# Patient Record
Sex: FEMALE | Race: WHITE | NOT HISPANIC OR LATINO | Employment: OTHER | URBAN - METROPOLITAN AREA
[De-identification: names, ages, dates, MRNs, and addresses within clinical notes are randomized per-mention and may not be internally consistent; named-entity substitution may affect disease eponyms.]

---

## 2017-03-09 ENCOUNTER — OFFICE VISIT (OUTPATIENT)
Dept: FAMILY MEDICINE | Facility: CLINIC | Age: 32
End: 2017-03-09
Payer: COMMERCIAL

## 2017-03-09 VITALS — HEIGHT: 64 IN

## 2017-03-09 DIAGNOSIS — R05.9 COUGH: Primary | ICD-10-CM

## 2017-03-09 PROCEDURE — 99213 OFFICE O/P EST LOW 20 MIN: CPT | Performed by: NURSE PRACTITIONER

## 2017-03-09 RX ORDER — ALBUTEROL SULFATE 90 UG/1
2 AEROSOL, METERED RESPIRATORY (INHALATION) EVERY 4 HOURS PRN
Qty: 1 INHALER | Refills: 0 | Status: SHIPPED | OUTPATIENT
Start: 2017-03-09 | End: 2017-07-18

## 2017-03-09 RX ORDER — ALBUTEROL SULFATE 0.83 MG/ML
1 SOLUTION RESPIRATORY (INHALATION) ONCE
Qty: 1 VIAL | Refills: 0
Start: 2017-03-09 | End: 2017-03-09

## 2017-03-09 RX ORDER — ALBUTEROL SULFATE 90 UG/1
2 AEROSOL, METERED RESPIRATORY (INHALATION) EVERY 4 HOURS PRN
Qty: 1 INHALER | Refills: 0
Start: 2017-03-09 | End: 2017-03-09

## 2017-03-09 NOTE — MR AVS SNAPSHOT
"              After Visit Summary   3/9/2017    Aurora Durant    MRN: [id number]           Patient Information     Date Of Birth          1985        Visit Information        Provider Department      3/9/2017 5:00 PM Yehuda Gomez APRN CNP [facility]        Today's Diagnoses     Cough    -  1       Follow-ups after your visit        Who to contact     If you have questions or need follow up information about today's clinic visit or your schedule please contact [facility] directly at [phone].  Normal or non-critical lab and imaging results will be communicated to you by Qubolehart, letter or phone within 4 business days after the clinic has received the results. If you do not hear from us within 7 days, please contact the clinic through MyWeddingt or phone. If you have a critical or abnormal lab result, we will notify you by phone as soon as possible.  Submit refill requests through TeamDynamix or call your pharmacy and they will forward the refill request to us. Please allow 3 business days for your refill to be completed.          Additional Information About Your Visit        MyChart Information     TeamDynamix gives you secure access to your electronic health record. If you see a primary care provider, you can also send messages to your care team and make appointments. If you have questions, please call your primary care clinic.  If you do not have a primary care provider, please call [phone] and they will assist you.        Care EveryWhere ID     This is your Care EveryWhere ID. This could be used by other organizations to access your [place] medical records  [id number]        Your Vitals Were     Height Breastfeeding?                5' 4\" (1.626 m) No           Blood Pressure from Last 3 Encounters:   03/09/17 (P) 98/62   10/19/15 100/60   06/10/15 90/57    Weight from Last 3 Encounters:   03/09/17 (P) 121 lb (54.9 kg)   10/19/15 122 lb (55.3 kg)   06/10/15 119 lb 1.6 oz (54 " kg)              Today, you had the following     No orders found for display         Today's Medication Changes          These changes are accurate as of: 3/9/17 11:59 PM.  If you have any questions, ask your nurse or doctor.               Start taking these medicines.        Dose/Directions    * albuterol (2.5 MG/3ML) 0.083% neb solution   Used for:  Cough        Dose:  1 vial   Take 1 vial (2.5 mg) by nebulization once for 1 dose   Quantity:  1 vial   Refills:  0       * albuterol 108 (90 BASE) MCG/ACT Inhaler   Commonly known as:  albuterol   Used for:  Cough        Dose:  2 puff   Inhale 2 puffs into the lungs every 4 hours as needed for shortness of breath / dyspnea or wheezing   Quantity:  1 Inhaler   Refills:  0       * Notice:  This list has 2 medication(s) that are the same as other medications prescribed for you. Read the directions carefully, and ask your doctor or other care provider to review them with you.      Stop taking these medicines if you haven't already. Please contact your care team if you have questions.     atovaquone-proguanil 250-100 MG per tablet   Commonly known as:  MALARONE           typhoid CR capsule   Commonly known as:  VIVOTIF                Where to get your medicines      Some of these will need a paper prescription and others can be bought over the counter.  Ask your nurse if you have questions.     Bring a paper prescription for each of these medications     albuterol 108 (90 BASE) MCG/ACT Inhaler       You don't need a prescription for these medications     albuterol (2.5 MG/3ML) 0.083% neb solution                Primary Care Provider Office Phone # Fax #    Ariana Garcia PA-C [phone]       [facility] [address] [facility] [address]  [facility] [address]        Thank you!     Thank you for choosing [facility]  for your care. Our goal is always to provide you with excellent care. Hearing back from our patients is one way we can continue to  improve our services. Please take a few minutes to complete the written survey that you may receive in the mail after your visit with us. Thank you!             Your Updated Medication List - Protect others around you: Learn how to safely use, store and throw away your medicines at www.disposemymeds.org.          This list is accurate as of: 3/9/17 11:59 PM.  Always use your most recent med list.                   Brand Name Dispense Instructions for use    * albuterol (2.5 MG/3ML) 0.083% neb solution     1 vial    Take 1 vial (2.5 mg) by nebulization once for 1 dose       * albuterol 108 (90 BASE) MCG/ACT Inhaler    albuterol    1 Inhaler    Inhale 2 puffs into the lungs every 4 hours as needed for shortness of breath / dyspnea or wheezing       VITAMIN D PO          * Notice:  This list has 2 medication(s) that are the same as other medications prescribed for you. Read the directions carefully, and ask your doctor or other care provider to review them with you.

## 2017-03-09 NOTE — PROGRESS NOTES
HPI    SUBJECTIVE:                                                    Aurora Durant is a 31 year old female who presents to clinic today for the following health issues:      Chief Complaint   Patient presents with     Cough     x 1.5 weeks; dry cough w/ wheezing; tried mucinex and Dayquil/Nyquil w/o relief       Boyfriend has pneumonia   10 days of occasionally productive cough   Has noticed a couple wheezes  Initially was fatigued   No significant ENT symptoms       Past Medical History   Diagnosis Date     Anxiety      ASCUS with positive high risk HPV 12-3-10     GERD (gastroesophageal reflux disease)      History of depression      Iritis 2012     Past Surgical History   Procedure Laterality Date     No history of surgery       Colposcopy cervix, loop electrode biopsy, combined  3-25-11     [place]     Social History   Substance Use Topics     Smoking status: Never Smoker     Smokeless tobacco: Never Used     Alcohol use Yes      Comment: 1-2 drinks per week     Current Outpatient Prescriptions   Medication Sig Dispense Refill     Cholecalciferol (VITAMIN D PO)        Allergies   Allergen Reactions     No Known Drug Allergy        Reviewed PMH, med list and allergies.      ROS  Detailed as above       BP (P) 98/62 (BP Location: Right arm, Patient Position: Chair, Cuff Size: Adult Regular)  Pulse (P) 65  Temp (P) 98.5  F (36.9  C) (Oral)  Wt (P) 121 lb (54.9 kg)  SpO2 (P) 100%  Breastfeeding? No  BMI (P) 20.77 kg/m2      Physical Exam   Constitutional: She is well-developed, well-nourished, and in no distress.   HENT:   Head: Normocephalic.   Right Ear: Tympanic membrane, external ear and ear canal normal.   Left Ear: Tympanic membrane, external ear and ear canal normal.   Mouth/Throat: Oropharynx is clear and moist. No oropharyngeal exudate.   Eyes: Conjunctivae are normal.   Neck: Normal range of motion.   Cardiovascular: Normal rate, regular rhythm and normal heart sounds.    No murmur heard.  Pulmonary/Chest:  Effort normal and breath sounds normal. No respiratory distress.   Dry cough. One wheeze noted on lung auscultation    Lymphadenopathy:     She has no cervical adenopathy.   Neurological: She is alert.   Skin: Skin is warm and dry.   Psychiatric: Mood and affect normal.   Vitals reviewed.      Assessment and Plan:       ICD-10-CM    1. Cough R05 albuterol (2.5 MG/3ML) 0.083% neb solution     albuterol (ALBUTEROL) 108 (90 BASE) MCG/ACT Inhaler     DISCONTINUED: albuterol (ALBUTEROL) 108 (90 BASE) MCG/ACT Inhaler       Cough that is likely viral  Given neb here in clinic  Wrote for inhaler to fill in a couple days if she begins wheezing more   She should call with worsening symptoms. She may need prednisone if she begins having more wheezing as well       HERBERT Mauricio, CNP  [facility]

## 2017-03-09 NOTE — NURSING NOTE
"Chief Complaint   Patient presents with     Cough     x 1.5 weeks; dry cough w/ wheezing; tried mucinex and Dayquil/Nyquil w/o relief       Initial BP (P) 98/62 (BP Location: Right arm, Patient Position: Chair, Cuff Size: Adult Regular)  Pulse (P) 65  Temp (P) 98.5  F (36.9  C) (Oral)  Ht 5' 4\" (1.626 m)  Wt (P) 121 lb (54.9 kg)  SpO2 (P) 100%  Breastfeeding? No  BMI (P) 20.77 kg/m2 Estimated body mass index is 20.77 kg/(m^2) (pended) as calculated from the following:    Height as of this encounter: 5' 4\" (1.626 m).    Weight as of this encounter: (P) 121 lb (54.9 kg).  Medication Reconciliation: complete     Lukasz Page CMA     "

## 2017-03-13 ENCOUNTER — TELEPHONE (OUTPATIENT)
Dept: FAMILY MEDICINE | Facility: CLINIC | Age: 32
End: 2017-03-13

## 2017-03-13 NOTE — TELEPHONE ENCOUNTER
Per patient's my chart note dated 3-:    I saw you last week regarding a cough. I am not feeling well today and need to take a day off work; however, my boss is requested a doctor's note? Can I please receive a note from you? It doesn't need to be anything specific- I just need proof that I saw you regarding an illness.     Telephone encounter to Yehuda Gomez.  Martha Gregory MA

## 2017-07-05 ENCOUNTER — MYC MEDICAL ADVICE (OUTPATIENT)
Dept: FAMILY MEDICINE | Facility: CLINIC | Age: 32
End: 2017-07-05

## 2017-07-11 ENCOUNTER — DOCUMENTATION ONLY (OUTPATIENT)
Dept: LAB | Facility: CLINIC | Age: 32
End: 2017-07-11

## 2017-07-11 DIAGNOSIS — Z00.00 ENCOUNTER FOR ROUTINE ADULT HEALTH EXAMINATION WITHOUT ABNORMAL FINDINGS: Primary | ICD-10-CM

## 2017-07-17 DIAGNOSIS — Z53.9 ERRONEOUS ENCOUNTER--DISREGARD: Primary | ICD-10-CM

## 2017-07-18 ENCOUNTER — OFFICE VISIT (OUTPATIENT)
Dept: FAMILY MEDICINE | Facility: CLINIC | Age: 32
End: 2017-07-18
Payer: COMMERCIAL

## 2017-07-18 VITALS
HEART RATE: 71 BPM | WEIGHT: 112.3 LBS | BODY MASS INDEX: 19.17 KG/M2 | OXYGEN SATURATION: 100 % | HEIGHT: 64 IN | SYSTOLIC BLOOD PRESSURE: 81 MMHG | DIASTOLIC BLOOD PRESSURE: 54 MMHG | TEMPERATURE: 98.6 F

## 2017-07-18 DIAGNOSIS — Z53.9 ERRONEOUS ENCOUNTER--DISREGARD: Primary | ICD-10-CM

## 2017-07-18 DIAGNOSIS — R63.4 LOSS OF WEIGHT: ICD-10-CM

## 2017-07-18 DIAGNOSIS — Z86.2 HISTORY OF ANEMIA: ICD-10-CM

## 2017-07-18 DIAGNOSIS — R19.5 LOOSE STOOLS: ICD-10-CM

## 2017-07-18 DIAGNOSIS — R53.83 OTHER FATIGUE: ICD-10-CM

## 2017-07-18 DIAGNOSIS — Z00.00 ENCOUNTER FOR ROUTINE ADULT HEALTH EXAMINATION WITHOUT ABNORMAL FINDINGS: Primary | ICD-10-CM

## 2017-07-18 DIAGNOSIS — Z12.4 SCREENING FOR MALIGNANT NEOPLASM OF CERVIX: ICD-10-CM

## 2017-07-18 LAB
ERYTHROCYTE [DISTWIDTH] IN BLOOD BY AUTOMATED COUNT: 11.5 % (ref 10–15)
HCT VFR BLD AUTO: 41 % (ref 35–47)
HGB BLD-MCNC: 13.7 G/DL (ref 11.7–15.7)
MCH RBC QN AUTO: 31.9 PG (ref 26.5–33)
MCHC RBC AUTO-ENTMCNC: 33.4 G/DL (ref 31.5–36.5)
MCV RBC AUTO: 95 FL (ref 78–100)
PLATELET # BLD AUTO: 170 10E9/L (ref 150–450)
RBC # BLD AUTO: 4.3 10E12/L (ref 3.8–5.2)
VIT B12 SERPL-MCNC: 356 PG/ML (ref 193–986)
WBC # BLD AUTO: 4.5 10E9/L (ref 4–11)

## 2017-07-18 PROCEDURE — 82728 ASSAY OF FERRITIN: CPT | Performed by: PHYSICIAN ASSISTANT

## 2017-07-18 PROCEDURE — 84443 ASSAY THYROID STIM HORMONE: CPT | Performed by: PHYSICIAN ASSISTANT

## 2017-07-18 PROCEDURE — 36415 COLL VENOUS BLD VENIPUNCTURE: CPT | Performed by: PHYSICIAN ASSISTANT

## 2017-07-18 PROCEDURE — 99395 PREV VISIT EST AGE 18-39: CPT | Performed by: PHYSICIAN ASSISTANT

## 2017-07-18 PROCEDURE — 82607 VITAMIN B-12: CPT | Performed by: PHYSICIAN ASSISTANT

## 2017-07-18 PROCEDURE — G0145 SCR C/V CYTO,THINLAYER,RESCR: HCPCS | Performed by: PHYSICIAN ASSISTANT

## 2017-07-18 PROCEDURE — 82306 VITAMIN D 25 HYDROXY: CPT | Performed by: PHYSICIAN ASSISTANT

## 2017-07-18 PROCEDURE — 80053 COMPREHEN METABOLIC PANEL: CPT | Performed by: PHYSICIAN ASSISTANT

## 2017-07-18 PROCEDURE — 85027 COMPLETE CBC AUTOMATED: CPT | Performed by: PHYSICIAN ASSISTANT

## 2017-07-18 PROCEDURE — 87624 HPV HI-RISK TYP POOLED RSLT: CPT | Performed by: PHYSICIAN ASSISTANT

## 2017-07-18 PROCEDURE — 83516 IMMUNOASSAY NONANTIBODY: CPT | Performed by: PHYSICIAN ASSISTANT

## 2017-07-18 NOTE — MR AVS SNAPSHOT
After Visit Summary   7/18/2017    Aurora Durant    MRN: [id number]           Patient Information     Date Of Birth          1985        Visit Information        Provider Department      7/18/2017 10:30 AM Ariana Garcia PA-C [facility]        Today's Diagnoses     Encounter for routine adult health examination without abnormal findings    -  1    Screening for malignant neoplasm of cervix        Loss of weight        Other fatigue        History of anemia        Loose stools          Care Instructions      Preventive Health Recommendations  Female Ages 26 - 39  Yearly exam:   See your health care provider every year in order to    Review health changes.     Discuss preventive care.      Review your medicines if you your doctor has prescribed any.    Until age 30: Get a Pap test every three years (more often if you have had an abnormal result).    After age 30: Talk to your doctor about whether you should have a Pap test every 3 years or have a Pap test with HPV screening every 5 years.   You do not need a Pap test if your uterus was removed (hysterectomy) and you have not had cancer.  You should be tested each year for STDs (sexually transmitted diseases), if you're at risk.   Talk to your provider about how often to have your cholesterol checked.  If you are at risk for diabetes, you should have a diabetes test (fasting glucose).  Shots: Get a flu shot each year. Get a tetanus shot every 10 years.   Nutrition:     Eat at least 5 servings of fruits and vegetables each day.    Eat whole-grain bread, whole-wheat pasta and brown rice instead of white grains and rice.    Talk to your provider about Calcium and Vitamin D.     Lifestyle    Exercise at least 150 minutes a week (30 minutes a day, 5 days of the week). This will help you control your weight and prevent disease.    Limit alcohol to one drink per day.    No smoking.     Wear sunscreen to prevent skin cancer.    See your dentist  "every six months for an exam and cleaning.            Follow-ups after your visit        Who to contact     If you have questions or need follow up information about today's clinic visit or your schedule please contact [facility] directly at [phone].  Normal or non-critical lab and imaging results will be communicated to you by Sprout Routehart, letter or phone within 4 business days after the clinic has received the results. If you do not hear from us within 7 days, please contact the clinic through Sprout Routehart or phone. If you have a critical or abnormal lab result, we will notify you by phone as soon as possible.  Submit refill requests through [employer] or call your pharmacy and they will forward the refill request to us. Please allow 3 business days for your refill to be completed.          Additional Information About Your Visit        Sprout RouteharFoodBox Information     [employer] gives you secure access to your electronic health record. If you see a primary care provider, you can also send messages to your care team and make appointments. If you have questions, please call your primary care clinic.  If you do not have a primary care provider, please call [phone] and they will assist you.        Care EveryWhere ID     This is your Care EveryWhere ID. This could be used by other organizations to access your [place] medical records  [id number]        Your Vitals Were     Pulse Temperature Height Last Period Pulse Oximetry BMI (Body Mass Index)    71 98.6  F (37  C) (Oral) 5' 4\" (1.626 m) 06/26/2017 (Exact Date) 100% 19.28 kg/m2       Blood Pressure from Last 3 Encounters:   07/18/17 (!) 81/54   03/09/17 (P) 98/62   10/19/15 100/60    Weight from Last 3 Encounters:   07/18/17 112 lb 4.8 oz (50.9 kg)   03/09/17 (P) 121 lb (54.9 kg)   10/19/15 122 lb (55.3 kg)              We Performed the Following     CBC with platelets     Comprehensive metabolic panel     Ferritin     Pap imaged thin layer screen with HPV - " recommended age 30 - 65 years (select HPV order below)     Tissue transglutaminase yousif IgA and IgG     TSH with free T4 reflex     Vitamin B12     Vitamin D Deficiency        Primary Care Provider Office Phone # Fax #    Ariana Garcia PA-C [phone]       [facility] [address]  [facility] [address]        Equal Access to Services     CHELE GLORIA : Hadii aad ku hadasho Soomaali, waaxda luqadaha, qaybta kaalmada adeegyada, waxay idiin hayaan adeeg kharash la'aan ah. So [facility] [phone].    ATENCIÓN: Si habla español, tiene a last disposición servicios gratuitos de asistencia lingüística. Llame al [phone].    We comply with applicable federal civil rights laws and [place] laws. We do not discriminate on the basis of race, color, national origin, age, disability sex, sexual orientation or gender identity.            Thank you!     Thank you for choosing [facility]  for your care. Our goal is always to provide you with excellent care. Hearing back from our patients is one way we can continue to improve our services. Please take a few minutes to complete the written survey that you may receive in the mail after your visit with us. Thank you!             Your Updated Medication List - Protect others around you: Learn how to safely use, store and throw away your medicines at www.disposemymeds.org.          This list is accurate as of: 7/18/17  3:52 PM.  Always use your most recent med list.                   Brand Name Dispense Instructions for use Diagnosis    IRON SUPPLEMENT PO      Take 325 mg by mouth twice a week        LYSINE PO      Take by mouth twice a week        VITAMIN D PO      Take 2,000 Int'l Units by mouth twice a week

## 2017-07-18 NOTE — PROGRESS NOTES
SUBJECTIVE:   CC: Aurora Durant is an 31 year old woman who presents for preventive health visit.     Pt using condoms for birth control  Hx of HPV with colp in 2011  I don't have all of her records.  She is vegan since Jan but now feeling more sluggish since May  She has hx of depression and that was worse over the winter  She is taking vit D regularly and iron once or twice per week  She had loose stools for a few weeks.  She cut out dairy and her acid reflux is much improved  She is taking a protein powder most mornings.    Healthy Habits:  Annual Exam:  Getting at least 3 servings of Calcium per day:: Yes  Bi-annual eye exam:: Yes  Dental care twice a year:: Yes  Sleep apnea or symptoms of sleep apnea:: Daytime drowsiness  Diet:: Vegetarian/vegan  Frequency of exercise:: 2-3 days/week  Taking medications regularly:: Yes  Medication side effects:: Not applicable  Additional concerns today:: YES  PHQ-2 Score: 2  Duration of exercise:: 30-45 minutes          Today's PHQ-2 Score:   PHQ-2 ( 1999 [employer]) 7/18/2017 7/11/2017   Q1: Little interest or pleasure in doing things 0 1   Q2: Feeling down, depressed or hopeless 1 1   PHQ-2 Score 1 2   Q1: Little interest or pleasure in doing things - Several days   Q2: Feeling down, depressed or hopeless - Several days   PHQ-2 Score - 2       Abuse: Current or Past(Physical, Sexual or Emotional)- No  Do you feel safe in your environment - Yes    Social History   Substance Use Topics     Smoking status: Never Smoker     Smokeless tobacco: Never Used     Alcohol use Yes      Comment: 1-2 drinks per week     The patient does not drink >3 drinks per day nor >7 drinks per week.    Past Medical History:   Diagnosis Date     Anxiety      ASCUS with positive high risk HPV 12-3-10     GERD (gastroesophageal reflux disease)      History of depression      Iritis 2012     Past Surgical History:   Procedure Laterality Date     COLPOSCOPY CERVIX, LOOP ELECTRODE BIOPSY, COMBINED  3-25-11     "[place]     NO HISTORY OF SURGERY       Current Outpatient Prescriptions   Medication Sig Dispense Refill     LYSINE PO Take by mouth twice a week       Ferrous Sulfate (IRON SUPPLEMENT PO) Take 325 mg by mouth twice a week       Cholecalciferol (VITAMIN D PO) Take 2,000 Int'l Units by mouth twice a week          ROS:  C: NEGATIVE for fever, chills, change in weight  I: NEGATIVE for worrisome rashes, moles or lesions  E: NEGATIVE for vision changes or irritation  ENT: NEGATIVE for ear, mouth and throat problems  R: NEGATIVE for significant cough or SOB  B: NEGATIVE for masses, tenderness or discharge  CV: NEGATIVE for chest pain, palpitations or peripheral edema  GI: NEGATIVE for nausea, abdominal pain, heartburn, or change in bowel habits  : NEGATIVE for unusual urinary or vaginal symptoms. Periods are regular.  M: NEGATIVE for significant arthralgias or myalgia  N: NEGATIVE for weakness, dizziness or paresthesias  P: NEGATIVE for changes in mood or affect    OBJECTIVE:   BP (!) 81/54 (BP Location: Left arm, Patient Position: Chair, Cuff Size: Adult Regular)  Pulse 71  Temp 98.6  F (37  C) (Oral)  Ht 5' 4\" (1.626 m)  Wt 112 lb 4.8 oz (50.9 kg)  LMP 06/26/2017 (Exact Date)  SpO2 100%  BMI 19.28 kg/m2  EXAM:  GENERAL: healthy, alert and no distress  EYES: Eyes grossly normal to inspection, PERRL and conjunctivae and sclerae normal  HENT: ear canals and TM's normal, nose and mouth without ulcers or lesions  NECK: no adenopathy, no asymmetry, masses, or scars and thyroid normal to palpation  RESP: lungs clear to auscultation - no rales, rhonchi or wheezes  BREAST: normal without masses, tenderness or nipple discharge and no palpable axillary masses or adenopathy  CV: regular rate and rhythm, normal S1 S2, no S3 or S4, no murmur, click or rub, no peripheral edema and peripheral pulses strong  ABDOMEN: soft, nontender, no hepatosplenomegaly, no masses and bowel sounds normal  : no vulvar lesions. Speculum exam " "normal. Bimanual exam neg for adnexal masses. Pap done and sent.  MS: no gross musculoskeletal defects noted, no edema  SKIN: no suspicious lesions or rashes  NEURO: Normal strength and tone, mentation intact and speech normal  PSYCH: mentation appears normal, affect normal/bright    ASSESSMENT/PLAN:   Assessment and Plan:     (Z00.00) Encounter for routine adult health examination without abnormal findings  (primary encounter diagnosis)  Comment:   Plan: Comprehensive metabolic panel            (Z12.4) Screening for malignant neoplasm of cervix  Comment:   Plan: Pap imaged thin layer screen with HPV -         recommended age 30 - 65 years (select HPV order        below)            (R63.4) Loss of weight  Comment:   Plan: TSH with free T4 reflex            (R53.83) Other fatigue  Comment: if labs normal, consider tx with antidepressant.  Plan: Vitamin B12, CBC with platelets, Vitamin D         Deficiency, Ferritin            (Z86.2) History of anemia  Comment:   Plan: CBC today    (R19.5) Loose stools  Comment:   Plan: Tissue transglutaminase yousif IgA and IgG              COUNSELING:   Reviewed preventive health counseling, as reflected in patient instructions         reports that she has never smoked. She has never used smokeless tobacco.    Estimated body mass index is 19.28 kg/(m^2) as calculated from the following:    Height as of this encounter: 5' 4\" (1.626 m).    Weight as of this encounter: 112 lb 4.8 oz (50.9 kg).       Counseling Resources:  ATP IV Guidelines  Pooled Cohorts Equation Calculator  Breast Cancer Risk Calculator  FRAX Risk Assessment  ICSI Preventive Guidelines  Dietary Guidelines for Americans, 2010  USDA's MyPlate  ASA Prophylaxis  Lung CA Screening    Ariana Garcia PA-C  [facility]  Answers for HPI/ROS submitted by the patient on 7/11/2017     "

## 2017-07-18 NOTE — NURSING NOTE
"Chief Complaint   Patient presents with     Physical       Initial BP (!) 81/54 (BP Location: Left arm, Patient Position: Chair, Cuff Size: Adult Regular)  Pulse 71  Temp 98.6  F (37  C) (Oral)  Ht 5' 4\" (1.626 m)  Wt 112 lb 4.8 oz (50.9 kg)  LMP 06/26/2017 (Exact Date)  SpO2 100%  BMI 19.28 kg/m2 Estimated body mass index is 19.28 kg/(m^2) as calculated from the following:    Height as of this encounter: 5' 4\" (1.626 m).    Weight as of this encounter: 112 lb 4.8 oz (50.9 kg).  Medication Reconciliation: complete   Martha Gregory MA  "

## 2017-07-19 LAB
ALBUMIN SERPL-MCNC: 4.5 G/DL (ref 3.4–5)
ALP SERPL-CCNC: 52 U/L (ref 40–150)
ALT SERPL W P-5'-P-CCNC: 17 U/L (ref 0–50)
ANION GAP SERPL CALCULATED.3IONS-SCNC: 5 MMOL/L (ref 3–14)
AST SERPL W P-5'-P-CCNC: 19 U/L (ref 0–45)
BILIRUB SERPL-MCNC: 0.6 MG/DL (ref 0.2–1.3)
BUN SERPL-MCNC: 7 MG/DL (ref 7–30)
CALCIUM SERPL-MCNC: 9.3 MG/DL (ref 8.5–10.1)
CHLORIDE SERPL-SCNC: 106 MMOL/L (ref 94–109)
CO2 SERPL-SCNC: 27 MMOL/L (ref 20–32)
CREAT SERPL-MCNC: 0.93 MG/DL (ref 0.52–1.04)
DEPRECATED CALCIDIOL+CALCIFEROL SERPL-MC: 47 UG/L (ref 20–75)
FERRITIN SERPL-MCNC: 40 NG/ML (ref 12–150)
GFR SERPL CREATININE-BSD FRML MDRD: 71 ML/MIN/1.7M2
GLUCOSE SERPL-MCNC: 80 MG/DL (ref 70–99)
POTASSIUM SERPL-SCNC: 4.1 MMOL/L (ref 3.4–5.3)
PROT SERPL-MCNC: 8.5 G/DL (ref 6.8–8.8)
SODIUM SERPL-SCNC: 138 MMOL/L (ref 133–144)
TSH SERPL DL<=0.005 MIU/L-ACNC: 1.17 MU/L (ref 0.4–4)
TTG IGA SER-ACNC: 1 U/ML
TTG IGG SER-ACNC: NORMAL U/ML

## 2017-07-19 ASSESSMENT — PATIENT HEALTH QUESTIONNAIRE - PHQ9: SUM OF ALL RESPONSES TO PHQ QUESTIONS 1-9: 6

## 2017-07-19 NOTE — PROGRESS NOTES
It was a pleasure seeing you for your physical examination.  I wanted to get back to you with your test results.  I have enclosed a copy for your review.      I am happy to report that your CBC or complete blood count is normal with no signs of anemia, leukemia or platelet abnormalities. Your chemistry panel shows no signs of diabetes.  Your blood salts, kidney tests, liver tests, and proteins are all fine.    Your blood test for celiac disease was negative.  Your vitamin D level is normal.  Your thyroid test (TSH) was normal.  Your ferritin (iron stores) was normal.  Your vitamin B12 was slightly low at 356. This should be >400.   Start an over the counter sublingual vitamin B12 1000mcg/day.    Let me know if you have any questions.    Ariana Garcia PA-C

## 2017-07-20 LAB
COPATH REPORT: NORMAL
PAP: NORMAL

## 2017-07-21 LAB
FINAL DIAGNOSIS: NORMAL
HPV HR 12 DNA CVX QL NAA+PROBE: NEGATIVE
HPV16 DNA SPEC QL NAA+PROBE: NEGATIVE
HPV18 DNA SPEC QL NAA+PROBE: NEGATIVE
SPECIMEN DESCRIPTION: NORMAL

## 2018-05-27 ENCOUNTER — APPOINTMENT (OUTPATIENT)
Dept: GENERAL RADIOLOGY | Age: 33
End: 2018-05-27
Payer: COMMERCIAL

## 2018-05-27 ENCOUNTER — HOSPITAL ENCOUNTER (EMERGENCY)
Age: 33
Discharge: HOME | End: 2018-05-27
Payer: COMMERCIAL

## 2018-05-27 VITALS
BODY MASS INDEX: 28.35 KG/M2 | SYSTOLIC BLOOD PRESSURE: 113 MMHG | HEIGHT: 63 IN | WEIGHT: 160 LBS | OXYGEN SATURATION: 98 % | DIASTOLIC BLOOD PRESSURE: 79 MMHG | TEMPERATURE: 98.5 F | RESPIRATION RATE: 16 BRPM

## 2018-05-27 DIAGNOSIS — S66.801A INJURY OF FLEXOR TENDON OF RIGHT HAND, INITIAL ENCOUNTER: ICD-10-CM

## 2018-05-27 DIAGNOSIS — S61.216A LACERATION OF RIGHT LITTLE FINGER WITHOUT FOREIGN BODY WITHOUT DAMAGE TO NAIL, INITIAL ENCOUNTER: Primary | ICD-10-CM

## 2018-05-27 PROCEDURE — 12001 RPR S/N/AX/GEN/TRNK 2.5CM/<: CPT

## 2018-05-27 PROCEDURE — 73140 X-RAY EXAM OF FINGER(S): CPT

## 2018-05-27 PROCEDURE — 99282 EMERGENCY DEPT VISIT SF MDM: CPT

## 2018-05-27 RX ORDER — CEPHALEXIN 500 MG/1
500 CAPSULE ORAL 4 TIMES DAILY
Qty: 20 CAPSULE | Refills: 0 | Status: SHIPPED | OUTPATIENT
Start: 2018-05-27 | End: 2018-06-01

## 2018-05-27 ASSESSMENT — PAIN DESCRIPTION - ORIENTATION: ORIENTATION: RIGHT

## 2018-05-27 ASSESSMENT — PAIN DESCRIPTION - FREQUENCY: FREQUENCY: INTERMITTENT

## 2018-05-27 ASSESSMENT — PAIN DESCRIPTION - PAIN TYPE: TYPE: ACUTE PAIN

## 2018-05-27 ASSESSMENT — PAIN SCALES - GENERAL: PAINLEVEL_OUTOF10: 8

## 2018-05-27 ASSESSMENT — PAIN DESCRIPTION - LOCATION: LOCATION: FINGER (COMMENT WHICH ONE)

## 2018-05-27 ASSESSMENT — ENCOUNTER SYMPTOMS: RESPIRATORY NEGATIVE: 1

## 2018-07-23 ENCOUNTER — OFFICE VISIT (OUTPATIENT)
Dept: FAMILY MEDICINE | Facility: CLINIC | Age: 33
End: 2018-07-23
Payer: COMMERCIAL

## 2018-07-23 VITALS
OXYGEN SATURATION: 100 % | RESPIRATION RATE: 14 BRPM | SYSTOLIC BLOOD PRESSURE: 94 MMHG | DIASTOLIC BLOOD PRESSURE: 60 MMHG | WEIGHT: 108 LBS | HEIGHT: 64 IN | BODY MASS INDEX: 18.44 KG/M2 | HEART RATE: 64 BPM | TEMPERATURE: 98.2 F

## 2018-07-23 DIAGNOSIS — R63.6 UNDERWEIGHT: ICD-10-CM

## 2018-07-23 DIAGNOSIS — R10.9 STOMACH PAIN: ICD-10-CM

## 2018-07-23 DIAGNOSIS — R11.0 NAUSEA: ICD-10-CM

## 2018-07-23 DIAGNOSIS — R53.83 FATIGUE, UNSPECIFIED TYPE: ICD-10-CM

## 2018-07-23 DIAGNOSIS — Z78.9 VEGAN DIET: Primary | ICD-10-CM

## 2018-07-23 LAB
ERYTHROCYTE [DISTWIDTH] IN BLOOD BY AUTOMATED COUNT: 11.6 % (ref 10–15)
HCT VFR BLD AUTO: 38.6 % (ref 35–47)
HGB BLD-MCNC: 13.1 G/DL (ref 11.7–15.7)
MCH RBC QN AUTO: 32 PG (ref 26.5–33)
MCHC RBC AUTO-ENTMCNC: 33.9 G/DL (ref 31.5–36.5)
MCV RBC AUTO: 94 FL (ref 78–100)
PLATELET # BLD AUTO: 209 10E9/L (ref 150–450)
RBC # BLD AUTO: 4.09 10E12/L (ref 3.8–5.2)
WBC # BLD AUTO: 5.9 10E9/L (ref 4–11)

## 2018-07-23 PROCEDURE — 82746 ASSAY OF FOLIC ACID SERUM: CPT | Performed by: PHYSICIAN ASSISTANT

## 2018-07-23 PROCEDURE — 82306 VITAMIN D 25 HYDROXY: CPT | Performed by: PHYSICIAN ASSISTANT

## 2018-07-23 PROCEDURE — 84443 ASSAY THYROID STIM HORMONE: CPT | Performed by: PHYSICIAN ASSISTANT

## 2018-07-23 PROCEDURE — 80053 COMPREHEN METABOLIC PANEL: CPT | Performed by: PHYSICIAN ASSISTANT

## 2018-07-23 PROCEDURE — 82607 VITAMIN B-12: CPT | Performed by: PHYSICIAN ASSISTANT

## 2018-07-23 PROCEDURE — 85027 COMPLETE CBC AUTOMATED: CPT | Performed by: PHYSICIAN ASSISTANT

## 2018-07-23 PROCEDURE — 83540 ASSAY OF IRON: CPT | Performed by: PHYSICIAN ASSISTANT

## 2018-07-23 PROCEDURE — 99214 OFFICE O/P EST MOD 30 MIN: CPT | Performed by: PHYSICIAN ASSISTANT

## 2018-07-23 PROCEDURE — 83550 IRON BINDING TEST: CPT | Performed by: PHYSICIAN ASSISTANT

## 2018-07-23 PROCEDURE — 36415 COLL VENOUS BLD VENIPUNCTURE: CPT | Performed by: PHYSICIAN ASSISTANT

## 2018-07-23 NOTE — PROGRESS NOTES
SUBJECTIVE:   Aurora Durant is a 32 year old female who presents to clinic today for the following health issues:    Nausea      Duration: 2 weeks    Description (location/character/radiation): patient states that she has been feeling run down, fatigue, nausea and loss of appetite, sometimes feels like she has knots in her stomach. She states that she has been on a vegan diet.    Intensity:  mild, moderate    Accompanying signs and symptoms: see above; was taking vitamin B12 but this did not make her stomach feel good was taking for a few months about a year ago.  Gradually stopped taking.  Recently started a B12 and iron supplement but made stomach worse so she stopped again.      History (similar episodes/previous evaluation): None    Precipitating or alleviating factors: None    Therapies tried and outcome: None     Reviewed and updated as needed this visit by clinical staff  Tobacco  Allergies  Meds  Problems  Med Hx  Surg Hx  Fam Hx  Soc Hx        Reviewed and updated as needed this visit by Provider  Tobacco  Allergies  Meds  Problems  Med Hx  Surg Hx  Fam Hx  Soc Hx        Additional complaints: None    HPI additional notes: Aurora presents today with   Chief Complaint   Patient presents with     Nausea   Has also been having fluctuating symptoms of depression and anxiety.  Has been seeing a therapist since Feb which seems to be helping.  Has had weight loss since changing to a vegan diet with intermittent episodes of fatigue.  Has had 2 weeks of stomach upset and anorexia.  Her brother got  on Saturday during which time she was very busy.  Has not been having regular bowel movements as she has been eating less but denies any diarrhea or significant constipation.       ROS:  C: Negative for fever or chills. Positive for weight loss unintentional  Skin: Negative for worrisome rashes or lesions  ENT: Negative for ear, mouth and throat problems  Resp: Positive for shortness of breath on  "exertion  CV: Negative for chest pain or peripheral edema  GI:POSITIVE for abdominal pain periumbilical, constipation, gas or bloating, nausea, poor appetite and weight loss and NEGATIVE for diarrhea, heartburn or reflux, hematemesis, hematochezia, jaundice, melena and vomiting  MS: POSITIVE for generalized fatigue and malaise.  NEURO: Negative  for headaches or dizziness.  PSYCHIATRIC: POSITIVE for depressed mood or anxiety. Negative for thoughts of self harm or suicide  ROS all other systems negative.    Chart Review:  History   Smoking Status     Never Smoker   Smokeless Tobacco     Never Used     Recent Labs   Lab Test  07/18/17   1121  06/10/15   1424   ALT  17   --    CR  0.93   --    GFRESTIMATED  71   --    GFRESTBLACK  86   --    POTASSIUM  4.1   --    TSH  1.17  1.06      BP Readings from Last 3 Encounters:   07/23/18 94/60   07/18/17 (!) 81/54   03/09/17 (P) 98/62    Wt Readings from Last 3 Encounters:   07/23/18 108 lb (49 kg)   07/18/17 112 lb 4.8 oz (50.9 kg)   03/09/17 (P) 121 lb (54.9 kg)                  Patient Active Problem List   Diagnosis     Anxiety     Vegan diet     Underweight     Past Surgical History:   Procedure Laterality Date     COLPOSCOPY CERVIX, LOOP ELECTRODE BIOPSY, COMBINED  3-25-11    [place]     NO HISTORY OF SURGERY       Problem list, Medication list, Allergies, Medical/Social/Surg hx reviewed in [facility], updated as appropriate.   OBJECTIVE:                                                    BP 94/60  Pulse 64  Temp 98.2  F (36.8  C) (Tympanic)  Resp 14  Ht 5' 4\" (1.626 m)  Wt 108 lb (49 kg)  LMP 07/13/2018 (Approximate)  SpO2 100%  BMI 18.54 kg/m2  Body mass index is 18.54 kg/(m^2).  GENERAL: healthy, alert, in no acute distress  EYES: Grossly normal to inspection, EOMI, PERRL  HENT: Mucous mebranes moist.  NECK: Non-tender, no adenopathy.  RESP: lungs clear to auscultation - no rales, no rhonchi, no wheezes  CV: regular rate and rhythm, normal S1 S2. No peripheral " edema.  ABDOMEN: soft, nontender, no hepatosplenomegaly or masses. Normal bowel sounds in all four quadrants. Kim's negative, Rovsing's negative. No rebound.  MS: no gross deformities noted.  No CVA tenderness. No paralumbar tenderness.  SKIN: no suspicious lesions, no rashes  PSYCH: Alert and oriented times 3;  Able to articulate logical thoughts. Affect is normal.    Diagnostic test results:  Pending     ASSESSMENT/PLAN:                                                          ICD-10-CM    1. Vegan diet Z78.9 CBC with platelets     Vitamin B12     Iron and iron binding capacity     Folate     Vitamin D Deficiency   2. Fatigue, unspecified type R53.83 TSH with free T4 reflex     Vitamin D Deficiency     Comprehensive metabolic panel   3. Nausea R11.0    4. Stomach pain R10.9    5. Underweight R63.6      Will check labs today.  Pt has no pain on exam that would be consistent with cholecystitis or appendicitis.  She is currently seeing a therapist weekly which is helping with her depression and anxiety.  Discussed that these could be causing her current symptoms and we may want to try a low dose of medication to see if that would help improve her symptoms.  May also want to try starting a fiber supplement and probiotic daily to see if that helps with symptoms.  Will be in touch with patient once lab results return.    Please see patient instructions for treatment details.    Follow up as needed.    Kirstie Rene PA-C  [facility]

## 2018-07-23 NOTE — PATIENT INSTRUCTIONS
Fatigue  What is fatigue?   Fatigue is a condition of tiredness or weakness that is physical or mental, or both.   How does it occur?   Fatigue can happen for many reasons, but it is especially likely when you are having a lot of physical or mental stress. Fatigue may be caused by:   an illness   hormone problems, such as thyroid problems   overexertion   poor physical condition   lack of exercise   not enough sleep   overweight   poor diet   stress   emotional or psychological problems, especially depression   some medicines.   Fatigue can also be a symptom of a heart attack, especially in women. In this case, it usually is new and is severe fatigue that starts a day or two or just a few hours before a heart attack. Sometimes the fatigue starts a couple of weeks before a heart attack. Because new, unexplained fatigue can mean a heart attack is about to happen, it should be checked by your healthcare provider.   Overwhelming fatigue that lasts for at least 6 months and interferes with your daily life may be caused by a medical problem called chronic fatigue syndrome.   What are the symptoms?   Symptoms of fatigue are:   weakness   tiredness   indifference   lack of energy   lack of your usual endurance or stamina.   How is it diagnosed?   Your healthcare provider will review your symptoms and ask about your daily routine, work habits, environment, and emotional well-being. Your provider may examine you. You may have blood tests to check for diseases that can cause fatigue, such as diabetes, hypothyroidism, heart disease, lung disease, and anemia.   How is it treated?   The treatment depends on the cause. If fatigue is a symptom of another condition or illness, such as thyroid problems, that condition or disease will be the focus of treatment. If the cause is emotional or psychological, your healthcare provider may  you or refer you to a therapist for evaluation and counseling.   If new fatigue is  caused by worsening heart health, prompt recognition and treatment of heart disease may prevent a heart attack.   How long do the effects last?   The effects will last as long as the cause of the symptoms exists.   How can I take care of myself?   Get enough rest and sleep.   Eat a healthy diet. If you are overweight, begin a weight loss program after checking with your healthcare provider.   Walk or exercise according to your healthcare provider's recommendations. Exercise can increase your energy, improve your mood, and help you sleep better.   See a counselor if you are having emotional problems.   Learn to use deep breathing techniques, visualization, and meditation to relieve stress.   Allow yourself time to relax and do things you enjoy.   Meet new people and develop new interests.   How can I prevent fatigue?   If you are working longer hours or doing more physical work, allow yourself more time to sleep or rest.   If your work activity has become more strenuous, take breaks during the day to sit and rest.   Ask your provider about taking vitamin and mineral supplements.   Consider eating smaller meals 4 to 6 times a day if that seems to help you maintain a higher energy level. Eat more complex carbohydrates, such as whole grain rice and pasta, and eat less fat. Avoid foods that contain a lot of sugar. Avoid overeating.   Stop smoking.   Avoid caffeine, alcohol, and other drugs. In addition to their other negative effects, they can keep you from sleeping well.     Published by [employer].  This content is reviewed periodically and is subject to change as new health information becomes available. The information is intended to inform and educate and is not a replacement for medical evaluation, advice, diagnosis or treatment by a healthcare professional.   Developed by Sarah Hein RN, [place], and [employer].   ? 2010 [facility] and/or its affiliates. All Rights Reserved.   Copyright   Clinical Reference  Systems 2011  Adult Health Advisor

## 2018-07-23 NOTE — MR AVS SNAPSHOT
After Visit Summary   7/23/2018    Aurora Durant    MRN: [id number]           Patient Information     Date Of Birth          1985        Visit Information        Provider Department      7/23/2018 3:50 PM Kirstie Rene PA-C [facility]        Today's Diagnoses     Vegan diet    -  1    Fatigue, unspecified type        Nausea        Stomach pain        Underweight          Care Instructions                Fatigue  What is fatigue?   Fatigue is a condition of tiredness or weakness that is physical or mental, or both.   How does it occur?   Fatigue can happen for many reasons, but it is especially likely when you are having a lot of physical or mental stress. Fatigue may be caused by:   an illness   hormone problems, such as thyroid problems   overexertion   poor physical condition   lack of exercise   not enough sleep   overweight   poor diet   stress   emotional or psychological problems, especially depression   some medicines.   Fatigue can also be a symptom of a heart attack, especially in women. In this case, it usually is new and is severe fatigue that starts a day or two or just a few hours before a heart attack. Sometimes the fatigue starts a couple of weeks before a heart attack. Because new, unexplained fatigue can mean a heart attack is about to happen, it should be checked by your healthcare provider.   Overwhelming fatigue that lasts for at least 6 months and interferes with your daily life may be caused by a medical problem called chronic fatigue syndrome.   What are the symptoms?   Symptoms of fatigue are:   weakness   tiredness   indifference   lack of energy   lack of your usual endurance or stamina.   How is it diagnosed?   Your healthcare provider will review your symptoms and ask about your daily routine, work habits, environment, and emotional well-being. Your provider may examine you. You may have blood tests to check for diseases that  can cause fatigue, such as diabetes, hypothyroidism, heart disease, lung disease, and anemia.   How is it treated?   The treatment depends on the cause. If fatigue is a symptom of another condition or illness, such as thyroid problems, that condition or disease will be the focus of treatment. If the cause is emotional or psychological, your healthcare provider may  you or refer you to a therapist for evaluation and counseling.   If new fatigue is caused by worsening heart health, prompt recognition and treatment of heart disease may prevent a heart attack.   How long do the effects last?   The effects will last as long as the cause of the symptoms exists.   How can I take care of myself?   Get enough rest and sleep.   Eat a healthy diet. If you are overweight, begin a weight loss program after checking with your healthcare provider.   Walk or exercise according to your healthcare provider's recommendations. Exercise can increase your energy, improve your mood, and help you sleep better.   See a counselor if you are having emotional problems.   Learn to use deep breathing techniques, visualization, and meditation to relieve stress.   Allow yourself time to relax and do things you enjoy.   Meet new people and develop new interests.   How can I prevent fatigue?   If you are working longer hours or doing more physical work, allow yourself more time to sleep or rest.   If your work activity has become more strenuous, take breaks during the day to sit and rest.   Ask your provider about taking vitamin and mineral supplements.   Consider eating smaller meals 4 to 6 times a day if that seems to help you maintain a higher energy level. Eat more complex carbohydrates, such as whole grain rice and pasta, and eat less fat. Avoid foods that contain a lot of sugar. Avoid overeating.   Stop smoking.   Avoid caffeine, alcohol, and other drugs. In addition to their other negative effects, they can keep you from sleeping well.      Published by [employer].  This content is reviewed periodically and is subject to change as new health information becomes available. The information is intended to inform and educate and is not a replacement for medical evaluation, advice, diagnosis or treatment by a healthcare professional.   Developed by Sarah Hein RN, [place], and [employer].   ? 2010 [facility] and/or its affiliates. All Rights Reserved.   Copyright   Clinical Reference Systems 2011  Adult Health Advisor                Follow-ups after your visit        Who to contact     If you have questions or need follow up information about today's clinic visit or your schedule please contact [facility] directly at [phone].  Normal or non-critical lab and imaging results will be communicated to you by MyChart, letter or phone within 4 business days after the clinic has received the results. If you do not hear from us within 7 days, please contact the clinic through [employer] or phone. If you have a critical or abnormal lab result, we will notify you by phone as soon as possible.  Submit refill requests through [employer] or call your pharmacy and they will forward the refill request to us. Please allow 3 business days for your refill to be completed.          Additional Information About Your Visit        mSpot Information     [employer] gives you secure access to your electronic health record. If you see a primary care provider, you can also send messages to your care team and make appointments. If you have questions, please call your primary care clinic.  If you do not have a primary care provider, please call [phone] and they will assist you.        Care EveryWhere ID     This is your Care EveryWhere ID. This could be used by other organizations to access your [place] medical records  [id number]        Your Vitals Were     Pulse Temperature Respirations Height Last Period Pulse Oximetry    64 98.2  F (36.8  " C) (Tympanic) 14 5' 4\" (1.626 m) 07/13/2018 (Approximate) 100%    BMI (Body Mass Index)                   18.54 kg/m2            Blood Pressure from Last 3 Encounters:   07/23/18 94/60   07/18/17 (!) 81/54   03/09/17 (P) 98/62    Weight from Last 3 Encounters:   07/23/18 108 lb (49 kg)   07/18/17 112 lb 4.8 oz (50.9 kg)   03/09/17 (P) 121 lb (54.9 kg)              We Performed the Following     CBC with platelets     Comprehensive metabolic panel     Folate     Iron and iron binding capacity     TSH with free T4 reflex     Vitamin B12     Vitamin D Deficiency          Today's Medication Changes          These changes are accurate as of 7/23/18  4:16 PM.  If you have any questions, ask your nurse or doctor.               Stop taking these medicines if you haven't already. Please contact your care team if you have questions.     escitalopram 10 MG tablet   Commonly known as:  LEXAPRO   Stopped by:  Kirstie Rene PA-C                    Primary Care Provider Office Phone # Fax #    Ariana Maddie Garcia PA-C [phone] [address] [facility] [address]  [facility] [address]        Equal Access to Services     CHELE GLORIA AH: Hadii janeth ku hadasho Soomaali, waaxda luqadaha, qaybta kaalmada adeegyada, cindy perales. So [facility] [phone].    ATENCIÓN: Si habla español, tiene a last disposición servicios gratuitos de asistencia lingüística. Llras al [phone].    We comply with applicable federal civil rights laws and [place] laws. We do not discriminate on the basis of race, color, national origin, age, disability, sex, sexual orientation, or gender identity.            Thank you!     Thank you for choosing [facility]  for your care. Our goal is always to provide you with excellent care. Hearing back from our patients is one way we can continue to improve our services. Please take a few minutes to complete the written survey that you may receive " in the mail after your visit with us. Thank you!             Your Updated Medication List - Protect others around you: Learn how to safely use, store and throw away your medicines at www.disposemymeds.org.          This list is accurate as of 7/23/18  4:16 PM.  Always use your most recent med list.                   Brand Name Dispense Instructions for use Diagnosis    IRON SUPPLEMENT PO      Take 325 mg by mouth twice a week        LYSINE PO      Take by mouth twice a week        VITAMIN D PO      Take 2,000 Int'l Units by mouth twice a week

## 2018-07-24 LAB
ALBUMIN SERPL-MCNC: 4.2 G/DL (ref 3.4–5)
ALP SERPL-CCNC: 48 U/L (ref 40–150)
ALT SERPL W P-5'-P-CCNC: 21 U/L (ref 0–50)
ANION GAP SERPL CALCULATED.3IONS-SCNC: 5 MMOL/L (ref 3–14)
AST SERPL W P-5'-P-CCNC: 21 U/L (ref 0–45)
BILIRUB SERPL-MCNC: 0.4 MG/DL (ref 0.2–1.3)
BUN SERPL-MCNC: 7 MG/DL (ref 7–30)
CALCIUM SERPL-MCNC: 8.7 MG/DL (ref 8.5–10.1)
CHLORIDE SERPL-SCNC: 108 MMOL/L (ref 94–109)
CO2 SERPL-SCNC: 29 MMOL/L (ref 20–32)
CREAT SERPL-MCNC: 0.76 MG/DL (ref 0.52–1.04)
DEPRECATED CALCIDIOL+CALCIFEROL SERPL-MC: 45 UG/L (ref 20–75)
FOLATE SERPL-MCNC: 19.6 NG/ML
GFR SERPL CREATININE-BSD FRML MDRD: 89 ML/MIN/1.7M2
GLUCOSE SERPL-MCNC: 73 MG/DL (ref 70–99)
IRON SATN MFR SERPL: 25 % (ref 15–46)
IRON SERPL-MCNC: 74 UG/DL (ref 35–180)
POTASSIUM SERPL-SCNC: 3.7 MMOL/L (ref 3.4–5.3)
PROT SERPL-MCNC: 7.8 G/DL (ref 6.8–8.8)
SODIUM SERPL-SCNC: 142 MMOL/L (ref 133–144)
TIBC SERPL-MCNC: 292 UG/DL (ref 240–430)
TSH SERPL DL<=0.005 MIU/L-ACNC: 0.84 MU/L (ref 0.4–4)
VIT B12 SERPL-MCNC: 383 PG/ML (ref 193–986)

## 2018-07-25 NOTE — PROGRESS NOTES
Boo Simon,    I just wanted to let you know that your lab results have been reviewed and are attached.    - Your lab results look great, everything is normal.  You do not have any deficiencies due to your diet that could be causing your symptoms.  I'd like you to start metamucil once daily and a probiotic once daily to see if that helps with your symptoms.  I'm also going to send you a couple of questionnaires to better evaluated your mood to see how much that is contributing.    Please let me know if you have any questions and have a great week!    Sincerely,    Yuliet Rene PA-C    [facility]  [address]  [place] [address] [phone] (p)

## 2018-12-07 ENCOUNTER — TELEPHONE (OUTPATIENT)
Dept: INFECTIOUS DISEASES | Facility: CLINIC | Age: 33
End: 2018-12-07

## 2018-12-07 NOTE — TELEPHONE ENCOUNTER
Pt recently moved to [facility] from [place] and has a week of [place] left. She is seeking refill and ID appt . We will apply for [facility] coverage and ask our pharmacy to transfer hie Rx from [place].

## 2018-12-12 DIAGNOSIS — Z21 HUMAN IMMUNODEFICIENCY VIRUS I INFECTION (H): Primary | ICD-10-CM

## 2018-12-18 DIAGNOSIS — Z21 HIV (HUMAN IMMUNODEFICIENCY VIRUS INFECTION) (H): Primary | ICD-10-CM

## 2018-12-18 DIAGNOSIS — Z77.21 PERSONAL HISTORY OF EXPOSURE TO POTENTIALLY HAZARDOUS BODY FLUIDS: ICD-10-CM

## 2018-12-18 NOTE — PROGRESS NOTES
Per [facility] Ambulatory Care Protocol, Pt is due for routine labs based on disease state or monitoring of medications. Lab orders entered per clinic protocol.  Ama Baca RN

## 2019-03-20 ENCOUNTER — OFFICE VISIT (OUTPATIENT)
Dept: INFECTIOUS DISEASES | Facility: CLINIC | Age: 34
End: 2019-03-20
Attending: INTERNAL MEDICINE
Payer: COMMERCIAL

## 2019-03-20 VITALS
DIASTOLIC BLOOD PRESSURE: 72 MMHG | BODY MASS INDEX: 33.36 KG/M2 | HEIGHT: 63 IN | HEART RATE: 100 BPM | TEMPERATURE: 98.4 F | OXYGEN SATURATION: 100 % | WEIGHT: 188.3 LBS | SYSTOLIC BLOOD PRESSURE: 107 MMHG

## 2019-03-20 DIAGNOSIS — L29.9 ITCHING: ICD-10-CM

## 2019-03-20 DIAGNOSIS — Z21 ASYMPTOMATIC HUMAN IMMUNODEFICIENCY VIRUS (HIV) INFECTION STATUS (H): ICD-10-CM

## 2019-03-20 DIAGNOSIS — Z87.42 HISTORY OF ABNORMAL CERVICAL PAP SMEAR: ICD-10-CM

## 2019-03-20 DIAGNOSIS — Z21 HUMAN IMMUNODEFICIENCY VIRUS I INFECTION (H): Primary | ICD-10-CM

## 2019-03-20 PROCEDURE — G0463 HOSPITAL OUTPT CLINIC VISIT: HCPCS | Mod: ZF

## 2019-03-20 SDOH — HEALTH STABILITY: MENTAL HEALTH: HOW OFTEN DO YOU HAVE A DRINK CONTAINING ALCOHOL?: NEVER

## 2019-03-20 ASSESSMENT — MIFFLIN-ST. JEOR: SCORE: 1533.25

## 2019-03-20 ASSESSMENT — PAIN SCALES - GENERAL: PAINLEVEL: NO PAIN (0)

## 2019-03-20 NOTE — PROGRESS NOTES
[facility] - HIV Care  Patient:  Hetal Lainez, Date of birth 1985, Medical record number [id number]  Date of Visit:  03/20/2019         Assessment and Recommendations:   Hetal is a 32 y/o women who has been HIV-positive since 2013.  Has been virally suppressed on Odefsey but recently transferred care to [place] from [place].  Presents today to establish care.    # HIV infection: Last viral load was undetectable with CD4 1138 in 10/2018.  Continues on Odefsey but has missed more doses since the move to [place].  - Refilled Odefsey.  Discussed with Princess and we will enroll in HIV meds program and arrange to have pill boxes made to help with dose tracking and adherence.  - Patient has another appointment today so will return for routine HIV labs later this week.  With more recent missed doses due to her chaotic schedule and living arrangements, her viral suppression may be sup-optimal but awaiting results of labs today.    # Itching: I do not think this is likely related to her HIV meds.  Suggested trial of fragrence-free moisturizer such as Aquaphor or Eucerin.  # History of abnormal paps: First abnormal pap age 18 with colposcopy at that time.  Has continued to have abnormals since then.  Most recently s/p colposcopy in 2018.  Had been having paps q6months but was told ok to space out to yearly.  Has not been rescreened since then.  Referral placed for OB/gyn for follow up of this.  # Vaccines: Tdap 12/2014, PCV13 7/7/07, PPSV23 2/10/14.  HAV and HBV non-immune in 2013.    Staffed with Dr. Zavala.    I spent 60 minutes in direct care with this patient, including >50% of time face-to-face with the patient counseling about HIV care, medication adherence, and preventative care.    Lian Hoyos MD, PhD  Adult & Pediatric Infectious Diseases Fellow PGY7, [facility]  Pager: [phone]         History of Infectious Disease Illness:   Hetal presents today to transfer  her care to [place] after moving from [place] with her boyfriend.  Her boyfriends has family here but Hetal does not have any other connections.    Hetal was previously followed by Dr. Mcallister at [facility] and had a good relationship with him.  She was initially started on Complera when she was first diagnosed and then switched to Odefsey, which she continues on at present.  She has historically been well controlled and had undetectable when labs checked last fall.  Since moving, her life has been a lot more chaotic, leading to more missed doses  She missed about 6 doses in a row right after moving as her pill bottle was misplaced. More recently she has missed 5 or 6 doses in the past month due to irregular schedule and living in a hotel.  She spends a lot of time around her boyfriend's family and they do not know about her diagnosis.     Her main concern is a lot of itching of her skin after minor scratches and trauma.  Dr. Mcallister tried stopping her meds for 4 days to see if it was a med effect but it did not improve.  She does think her skin is quite dry and has not used moisturizer.        HIV History:   Date of Diagnosis: 9/2013, established care in [place] 10/9/13  Approximated time of transmission:  CD4 Kosta: 432 in 9/2013  Viral Load at Diagnosis: 14K in 2013  Risk Factors: Heterosexual  Opportunistic Infections: None  CMV Status: Pending  Toxo Status: Pending  HLA  Status: Negative (1/8/16 in [place])  STI Screening: Syphilis, gonorrhea, chlamydia negative 10/3/18 ([place]), HCV negative 10/9/13  Tuberculosis Screening: Quantiferon negative 10/9/13 ([place])  Historical use of ARVs: Complera 10/2014-, Odefsey  Historical Resistance Mutations: None on 10/1/13        Past Medical and Surgical History:     Past Medical History:   Diagnosis Date     Chlamydia 02/10/2014    RX azithromycin     Gonorrhea 05/04/2016     Group B Streptococcus carrier state affecting pregnancy 2015     HIV infection (H) 09/2013     Vaginal  "delivery 01/04/2015       Past Surgical History:   Procedure Laterality Date     COLPOSCOPY             Family History:   No family history on file.        Social History:     Social History     Tobacco Use     Smoking status: Current Every Day Smoker     Types: Cigarettes     Smokeless tobacco: Never Used   Substance Use Topics     Alcohol use: No     Frequency: Never     Drug use: No     Social History     Social History Narrative     Not on file            Review of Systems:   CONSTITUTIONAL:  No fevers or chills  EYES: negative for icterus  ENT:  negative for hearing loss, tinnitus, sore throat  RESPIRATORY:  negative for cough, sputum or dyspnea  CARDIOVASCULAR:  negative for chest pain, palpitations  GASTROINTESTINAL:  negative for nausea, vomiting, diarrhea or constipation  GENITOURINARY:  negative for dysuria  HEME:  No easy bruising  INTEGUMENT:  negative for rash or pruritus  NEURO:  Negative for headache         Current Medications:     Current Outpatient Medications   Medication Sig Dispense Refill     emtricitabine-rilpivirine-tenofovir (ODEFSEY) 200-25-25 MG TABS per tablet Take 1 tablet by mouth daily 30 tablet 3            Immunization History:     Immunization History   Administered Date(s) Administered     Influenza (IIV3) PF 10/31/2013, 10/08/2014, 01/08/2016     Influenza Vaccine IM 3yrs+ 4 Valent IIV4 10/03/2018     Influenza Vaccine, 3 YRS +, IM (QUADRIVALENT W/PRESERVATIVES) 10/03/2016     Pneumo Conj 13-V (2010&after) 07/07/2017     Pneumococcal 23 valent 02/10/2014     TDAP Vaccine (Adacel) 12/10/2014            Allergies:   No Known Allergies         Physical Exam:   Vital signs:  /72   Pulse 100   Temp 98.4  F (36.9  C) (Oral)   Ht 1.6 m (5' 3\")   Wt 85.4 kg (188 lb 4.8 oz)   SpO2 100%   BMI 33.36 kg/m      Physical Examination:  GENERAL:  well-developed, well-nourished, seated in no acute distress.  HEENT:  Head is normocephalic, atraumatic   EYES:  Eyes have anicteric sclerae " without conjunctival injection   LUNGS:  Breathing comfortably on room air  CARDIOVASCULAR:  Warm and well perfused  SKIN:  No acute rashes.    NEUROLOGIC:  Grossly nonfocal. Active x4 extremities         Laboratory Data:     CD4   10/2018 1138    VL   10/2018 undetectable

## 2019-03-21 ENCOUNTER — ALLIED HEALTH/NURSE VISIT (OUTPATIENT)
Dept: PHARMACY | Facility: CLINIC | Age: 34
End: 2019-03-21
Payer: COMMERCIAL

## 2019-03-21 DIAGNOSIS — B20 HUMAN IMMUNODEFICIENCY VIRUS (HIV) DISEASE (H): Primary | ICD-10-CM

## 2019-03-21 PROCEDURE — 99207 ZZC NO CHARGE LOS: CPT | Performed by: PHARMACIST

## 2019-03-21 NOTE — PROGRESS NOTES
Therapy Management:                                                    Hetal Lainez is a 33 year old female called for a therapy management visit.  She was referred to me from Dr. Hoyos.     Reason for Consult: Medication adherence/med boxes provided by our pharmacy.    Discussion: Hetal reports she has been taking Odefsey for a while now, but has problems remembering doses. Reports she believes med boxes would help. Reports she tolerates medication well, and generally takes it in the afternoon after food.    Plan:  1. Discussed the importance of strict medication adherence to achieve and maintain an undetectable viral load.   2. Our pharmacy will fill med boxes for her and pt agreed to pick them up tomorrow (reports she has a few doses left)  3. Pt is asked to call with any questions/concerns.     Princess Barnard, [facility] Pharmacist.   [phone]

## 2019-03-23 PROBLEM — Z21 HUMAN IMMUNODEFICIENCY VIRUS I INFECTION (H): Status: ACTIVE | Noted: 2019-03-23

## 2019-03-23 PROBLEM — Z87.42 HISTORY OF ABNORMAL CERVICAL PAP SMEAR: Status: ACTIVE | Noted: 2019-03-23

## 2019-03-23 PROBLEM — Z21 HUMAN IMMUNODEFICIENCY VIRUS I INFECTION (H): Status: ACTIVE | Noted: 2019-03-20

## 2019-03-23 PROBLEM — Z87.42 HISTORY OF ABNORMAL CERVICAL PAP SMEAR: Status: ACTIVE | Noted: 2019-03-20

## 2019-03-23 NOTE — PROGRESS NOTES
[facility] Staff Note: Ms. Lainez was seen, examined, and the case was discussed with Dr. Hoyos, ID Fellow -- I agree with her new patient history and examination, assessment and plan in this outpatient ID Consult note. This note reflects my observations and opinions, and the plan outlined fully reflects my approach. I have reviewed the available history, radiology, laboratory results, and reports with the Fellow.

## 2019-04-22 ENCOUNTER — TELEPHONE (OUTPATIENT)
Dept: INFECTIOUS DISEASES | Facility: CLINIC | Age: 34
End: 2019-04-22

## 2019-04-22 NOTE — TELEPHONE ENCOUNTER
Spoke with pt. She reports that just went to urgent care. The doctor there thought her back pain was from a muscle, but did get a urine sample from her. Pt will call us back if she needs anything from Dr. Hoyos or our clinic.  Ama Baca RN

## 2019-04-22 NOTE — TELEPHONE ENCOUNTER
[facility] Call Center    Phone Message    May a detailed message be left on voicemail: yes    Reason for Call: Symptoms or Concerns     If patient has red-flag symptoms, warm transfer to triage line    Current symptom or concern: Kidneys hurting for 4 days now.     Symptoms have been present for:  4 day(s)    Has patient previously been seen for this? No    By Dr. Israel Hoyos    Date: 04/22/2019    Are there any new or worsening symptoms? Yes: It's worse this morning. Pt is unsure what she should do.       Action Taken: Message routed to:  [facility] ID

## 2019-04-24 ENCOUNTER — TELEPHONE (OUTPATIENT)
Dept: PHARMACY | Facility: CLINIC | Age: 34
End: 2019-04-24

## 2019-04-25 DIAGNOSIS — Z21 ASYMPTOMATIC HUMAN IMMUNODEFICIENCY VIRUS (HIV) INFECTION STATUS (H): ICD-10-CM

## 2019-04-25 DIAGNOSIS — Z77.21 PERSONAL HISTORY OF EXPOSURE TO POTENTIALLY HAZARDOUS BODY FLUIDS: ICD-10-CM

## 2019-04-25 LAB
ALBUMIN SERPL-MCNC: 4.2 G/DL (ref 3.4–5)
ALBUMIN UR-MCNC: NEGATIVE MG/DL
ALP SERPL-CCNC: 73 U/L (ref 40–150)
ALT SERPL W P-5'-P-CCNC: 28 U/L (ref 0–50)
ANION GAP SERPL CALCULATED.3IONS-SCNC: 4 MMOL/L (ref 3–14)
APPEARANCE UR: CLEAR
AST SERPL W P-5'-P-CCNC: 16 U/L (ref 0–45)
BASOPHILS # BLD AUTO: 0.1 10E9/L (ref 0–0.2)
BASOPHILS NFR BLD AUTO: 0.6 %
BILIRUB SERPL-MCNC: 0.4 MG/DL (ref 0.2–1.3)
BILIRUB UR QL STRIP: NEGATIVE
BUN SERPL-MCNC: 9 MG/DL (ref 7–30)
CALCIUM SERPL-MCNC: 9.1 MG/DL (ref 8.5–10.1)
CD3 CELLS # BLD: 1888 CELLS/UL (ref 603–2990)
CD3 CELLS NFR BLD: 75 % (ref 49–84)
CD3+CD4+ CELLS # BLD: 1259 CELLS/UL (ref 441–2156)
CD3+CD4+ CELLS NFR BLD: 50 % (ref 28–63)
CD3+CD4+ CELLS/CD3+CD8+ CLL BLD: 2 % (ref 1.4–2.6)
CD3+CD8+ CELLS # BLD: 624 CELLS/UL (ref 125–1312)
CD3+CD8+ CELLS NFR BLD: 25 % (ref 10–40)
CHLORIDE SERPL-SCNC: 105 MMOL/L (ref 94–109)
CO2 SERPL-SCNC: 28 MMOL/L (ref 20–32)
COLOR UR AUTO: YELLOW
CREAT SERPL-MCNC: 0.73 MG/DL (ref 0.52–1.04)
DIFFERENTIAL METHOD BLD: NORMAL
EOSINOPHIL # BLD AUTO: 0.2 10E9/L (ref 0–0.7)
EOSINOPHIL NFR BLD AUTO: 1.9 %
ERYTHROCYTE [DISTWIDTH] IN BLOOD BY AUTOMATED COUNT: 12.3 % (ref 10–15)
GFR SERPL CREATININE-BSD FRML MDRD: >90 ML/MIN/{1.73_M2}
GLUCOSE SERPL-MCNC: 98 MG/DL (ref 70–99)
GLUCOSE UR STRIP-MCNC: NEGATIVE MG/DL
HAV IGG SER QL IA: NONREACTIVE
HBV CORE AB SERPL QL IA: NONREACTIVE
HBV SURFACE AB SERPL IA-ACNC: 1.22 M[IU]/ML
HBV SURFACE AG SERPL QL IA: NONREACTIVE
HCT VFR BLD AUTO: 41.1 % (ref 35–47)
HCV AB SERPL QL IA: NONREACTIVE
HGB BLD-MCNC: 13.9 G/DL (ref 11.7–15.7)
HGB UR QL STRIP: NEGATIVE
IFC SPECIMEN: NORMAL
IMM GRANULOCYTES # BLD: 0 10E9/L (ref 0–0.4)
IMM GRANULOCYTES NFR BLD: 0.3 %
KETONES UR STRIP-MCNC: NEGATIVE MG/DL
LEUKOCYTE ESTERASE UR QL STRIP: NEGATIVE
LYMPHOCYTES # BLD AUTO: 2.5 10E9/L (ref 0.8–5.3)
LYMPHOCYTES NFR BLD AUTO: 28.2 %
MCH RBC QN AUTO: 32.6 PG (ref 26.5–33)
MCHC RBC AUTO-ENTMCNC: 33.8 G/DL (ref 31.5–36.5)
MCV RBC AUTO: 96 FL (ref 78–100)
MONOCYTES # BLD AUTO: 0.3 10E9/L (ref 0–1.3)
MONOCYTES NFR BLD AUTO: 3.8 %
NEUTROPHILS # BLD AUTO: 5.8 10E9/L (ref 1.6–8.3)
NEUTROPHILS NFR BLD AUTO: 65.2 %
NITRATE UR QL: NEGATIVE
NRBC # BLD AUTO: 0 10*3/UL
NRBC BLD AUTO-RTO: 0 /100
PH UR STRIP: 7 PH (ref 5–7)
PLATELET # BLD AUTO: 270 10E9/L (ref 150–450)
POTASSIUM SERPL-SCNC: 4.2 MMOL/L (ref 3.4–5.3)
PROT SERPL-MCNC: 7.8 G/DL (ref 6.8–8.8)
RBC # BLD AUTO: 4.27 10E12/L (ref 3.8–5.2)
RBC #/AREA URNS AUTO: <1 /HPF (ref 0–2)
SODIUM SERPL-SCNC: 136 MMOL/L (ref 133–144)
SOURCE: NORMAL
SP GR UR STRIP: 1.01 (ref 1–1.03)
SQUAMOUS #/AREA URNS AUTO: 1 /HPF (ref 0–1)
UROBILINOGEN UR STRIP-MCNC: 0 MG/DL (ref 0–2)
WBC # BLD AUTO: 8.9 10E9/L (ref 4–11)
WBC #/AREA URNS AUTO: <1 /HPF (ref 0–5)

## 2019-04-26 LAB
C TRACH DNA SPEC QL NAA+PROBE: NEGATIVE
CMV IGG SERPL QL IA: >8 AI (ref 0–0.8)
N GONORRHOEA DNA SPEC QL NAA+PROBE: NEGATIVE
SPECIMEN SOURCE: NORMAL
SPECIMEN SOURCE: NORMAL
T GONDII IGG SER QL: <3 IU/ML (ref 0–7.1)

## 2019-04-27 LAB
HIV1 RNA # PLAS NAA DL=20: NORMAL {COPIES}/ML
HIV1 RNA SERPL NAA+PROBE-LOG#: NORMAL {LOG_COPIES}/ML

## 2019-04-29 ENCOUNTER — TELEPHONE (OUTPATIENT)
Dept: INFECTIOUS DISEASES | Facility: CLINIC | Age: 34
End: 2019-04-29

## 2019-04-29 NOTE — TELEPHONE ENCOUNTER
[facility] Call Center    Phone Message    May a detailed message be left on voicemail: yes    Reason for Call: Other: per pt- stated she is waiting on test results, please call pt back asap thanks     Action Taken: Message routed to:  [facility]): I.D

## 2019-05-01 NOTE — TELEPHONE ENCOUNTER
Lian Hoyos MD  You 1 hour ago (1:42 PM)      Would you let her know her labs look fine. Normal CD4 and undetectable viral load.  She is coming to see me next week.        Relayed lab results to pt via phone. She also reports that she has recurring bacterial vaginosis and has severe symptoms. She is requesting metronidazole gel to be refilled that she has gotten in the past from an MD near her home that she recently moved from. Per Dr. Hoyos, pt should be seen in urgent care and be evaluated for this since she has never evaluated her for this before or prescribed metronidazole. Pt also has appt scheduled with Dr. Hoyos on 5/8. Tried calling pt to relay this information, but her voicemail box has not been set up yet.  Ama Baca RN

## 2019-05-01 NOTE — TELEPHONE ENCOUNTER
[facility] Call Center    Phone Message    May a detailed message be left on voicemail: no    Reason for Call: Other: Patient called on 5/1/19 at 5:43 p.m. and was informed of her appointment at NP clinic, 5/6 at 9:30am.     Action Taken: Message routed to:  [facility]): Infectious Disease

## 2019-05-01 NOTE — TELEPHONE ENCOUNTER
Scheduled pt an appt with the NP clinic, 5/6 at 9:30am, so that she can be evaluated for BV and establish primary care. Tried calling her to let her know, but her VM box has not been set up yet.  Ama Baca RN      *Call center staff- if pt calls back, please notify her of this appt. NP and primary care clinics did not have any openings for Thur or Fri this week.    Ama Baca RN

## 2019-05-06 ENCOUNTER — OFFICE VISIT (OUTPATIENT)
Dept: FAMILY MEDICINE | Facility: CLINIC | Age: 34
End: 2019-05-06
Payer: COMMERCIAL

## 2019-05-06 VITALS
OXYGEN SATURATION: 100 % | BODY MASS INDEX: 32.97 KG/M2 | WEIGHT: 186.1 LBS | DIASTOLIC BLOOD PRESSURE: 78 MMHG | SYSTOLIC BLOOD PRESSURE: 115 MMHG | HEART RATE: 81 BPM | HEIGHT: 63 IN

## 2019-05-06 DIAGNOSIS — Z12.4 SCREENING FOR CERVICAL CANCER: ICD-10-CM

## 2019-05-06 DIAGNOSIS — B96.89 BACTERIAL VAGINOSIS: ICD-10-CM

## 2019-05-06 DIAGNOSIS — B37.31 CANDIDAL VULVOVAGINITIS: ICD-10-CM

## 2019-05-06 DIAGNOSIS — N76.0 BACTERIAL VAGINOSIS: ICD-10-CM

## 2019-05-06 DIAGNOSIS — Z00.00 ENCOUNTER FOR MEDICAL EXAMINATION TO ESTABLISH CARE: Primary | ICD-10-CM

## 2019-05-06 PROCEDURE — G0476 HPV COMBO ASSAY CA SCREEN: HCPCS | Performed by: NURSE PRACTITIONER

## 2019-05-06 PROCEDURE — G0145 SCR C/V CYTO,THINLAYER,RESCR: HCPCS | Performed by: NURSE PRACTITIONER

## 2019-05-06 PROCEDURE — 87624 HPV HI-RISK TYP POOLED RSLT: CPT | Performed by: NURSE PRACTITIONER

## 2019-05-06 RX ORDER — METRONIDAZOLE 7.5 MG/G
1 GEL VAGINAL DAILY
Qty: 70 G | Refills: 0 | Status: SHIPPED | OUTPATIENT
Start: 2019-05-06

## 2019-05-06 RX ORDER — FLUCONAZOLE 100 MG/1
100 TABLET ORAL DAILY
Qty: 1 TABLET | Refills: 0 | Status: SHIPPED | OUTPATIENT
Start: 2019-05-06

## 2019-05-06 ASSESSMENT — ENCOUNTER SYMPTOMS
NAUSEA: 0
DECREASED APPETITE: 0
BLOOD IN STOOL: 0
DYSURIA: 0
CHILLS: 0
CONSTIPATION: 0
FATIGUE: 0
VOMITING: 0
ABDOMINAL PAIN: 0
HEMATURIA: 0
DIFFICULTY URINATING: 0
DIARRHEA: 0
FEVER: 0

## 2019-05-06 ASSESSMENT — MIFFLIN-ST. JEOR: SCORE: 1523.27

## 2019-05-06 NOTE — PATIENT INSTRUCTIONS
[facility] Medication Refill Request Information:  * Please contact your pharmacy regarding ANY request for medication refills.  ** [facility] Clinic Prescription Fax = [phone]  * Please allow 3 business days for routine medication refills.  * Please allow 5 business days for controlled substance medication refills.     [facility] Test Result notification information:  *You will be notified with in 7-10 days of your appointment day regarding the results of your test.  If you are on MyChart you will be notified as soon as the provider has reviewed the results and signed off on them.    [facility]: [phone]     Other location where it is more kid friendly:     [address] [address]  [phone]    Open 8-5pm Monday - Friday     Come back for your HPV vaccine.     2nd dose in 1 month (after 6/6/19)  3rd dose in 6 months (after 12/6/19)

## 2019-05-06 NOTE — PROGRESS NOTES
"       HPI       Hetal Lainez is a 33 year old woman who presents for the new concern(s) of:.  Chief Complaint   Patient presents with     Vaginal Problem     Pt is having white discharge, fishy odor and itchiness.      Establish Care     Pt is here to establish care.     Hetal has a previous medical history of HIV, abnormal pap of cervix with three previous colposcopies, and recurrent bacterial vaginosis.     Presents to clinic today to establish care and to have believed repeat bacterial vaginosis infection evaluated. Reports symptom onset about a week ago of white vaginal discharge, mild irritation/itching, and fishy, \"musty\" odor. Denies any abdominal or pelvic pain. No dysuria, constipation, nausea, or vomiting. Is not currently on any birth control, uses condoms with her significant other and other male partners. Denies any need for STI testing today, had done with ID Dr. Hoyos on 4/25/19 and has not had any new partners since. Previously tolerated treatment of Metro-Gel vaginally without issue; oral metronidazole has made her nauseated in past. Endorses often developing a concurrent yeast infection while on antibiotics for bacterial vaginosis and requesting prophylactic dose today.     Hetal is due for a pap - last was anywhere from 12-18 months ago while living in [place], [place]. Uncertain of results however \"they tried calling me a bunch and leaving me a lot of messages about my results. I never got back to them\". Will complete pap today.     Problem, Medication and Allergy Lists were       Current Outpatient Medications   Medication Sig Dispense Refill     emtricitabine-rilpivirine-tenofovir (ODEFSEY) 200-25-25 MG TABS per tablet Take 1 tablet by mouth daily 30 tablet 3     fluconazole (DIFLUCAN) 100 MG tablet Take 1 tablet (100 mg) by mouth daily 1 tablet 0     metroNIDAZOLE (METROGEL) 0.75 % vaginal gel Place 1 applicator (5 g) vaginally daily 70 g 0       No Known Allergies.    Patient is a new patient " "to this clinic and so  I reviewed/updated the Past Medical History, the Family History and the Social History .           Review of Systems:   Review of Systems     Constitutional:  Negative for fever, chills, fatigue and decreased appetite.   Cardiovascular:  Negative for chest pain.   Gastrointestinal:  Negative for nausea, vomiting, abdominal pain, diarrhea, constipation, blood in stool and change in stool.   Genitourinary:  Positive for vaginal discharge. Negative for dysuria, hematuria, difficulty urinating, genital sores, dyspareunia, voiding less frequently, excessive menstruation, menstrual changes and vaginal dryness.               Physical Exam:     Vitals:    05/06/19 1035   BP: 115/78   Pulse: 81   SpO2: 100%   Weight: 84.4 kg (186 lb 1.6 oz)   Height: 1.6 m (5' 3\")     Body mass index is 32.97 kg/m .  Vitals were reviewed and were normal     Physical Exam   Constitutional: She is oriented to person, place, and time. She appears well-developed and well-nourished. No distress.   HENT:   Head: Normocephalic and atraumatic.   Right Ear: External ear normal.   Left Ear: External ear normal.   Nose: Nose normal.   Eyes: Pupils are equal, round, and reactive to light. Conjunctivae and EOM are normal. Right eye exhibits no discharge. Left eye exhibits no discharge. No scleral icterus.   Neck: Normal range of motion.   Pulmonary/Chest: Effort normal. No respiratory distress.   Abdominal: Soft. Bowel sounds are normal. She exhibits no distension and no mass. There is no tenderness. There is no rebound and no guarding. No hernia. Hernia confirmed negative in the right inguinal area and confirmed negative in the left inguinal area.   Genitourinary: Uterus normal. No labial fusion. There is no rash, tenderness, lesion or injury on the right labia. There is no rash, tenderness, lesion or injury on the left labia. Cervix exhibits discharge. Cervix exhibits no motion tenderness and no friability. Right adnexum displays " no mass, no tenderness and no fullness. Left adnexum displays no mass, no tenderness and no fullness. No erythema, tenderness or bleeding in the vagina. No foreign body in the vagina. No signs of injury around the vagina. Vaginal discharge found.   Lymphadenopathy: No inguinal adenopathy noted on the right or left side.   Neurological: She is alert and oriented to person, place, and time. No cranial nerve deficit.   Skin: Skin is warm and dry. Capillary refill takes less than 2 seconds. No rash noted. She is not diaphoretic. No erythema. No pallor.   Psychiatric: She has a normal mood and affect. Her behavior is normal.   Vitals reviewed.        Results:     Pending pap with HPV screening    Assessment and Plan     1. Encounter for medical examination to establish care  With new FDA approval for HPV vaccine in adults up to age 45 offered to patient as she has history of several abnormal paps. Unable to stay today to have completed, stated she will return as a nurse-only visit later this week to start series.   - HPV VACCINE (GARDASIL), QUADRAVALENT; Future    2. Bacterial vaginosis  Recurrent BV. Encouraged patient to start OTC women's probiotic to help re-establish and maintain healthy vaginal mariana for ongoing infections. Reviewed side effects of medication, risks and benefits, and proper medication administration. Discussed red flag symptoms and when patient should seek immediate medical attention. Hetal Lainez verbalized understanding.   - metroNIDAZOLE (METROGEL) 0.75 % vaginal gel; Place 1 applicator (5 g) vaginally daily  Dispense: 70 g; Refill: 0    3. Candidal vulvovaginitis  - fluconazole (DIFLUCAN) 100 MG tablet; Take 1 tablet (100 mg) by mouth daily  Dispense: 1 tablet; Refill: 0    4. Screening for cervical cancer  Pap completed today as patient reports history of several abnormal results and colposcopies.   - Pap imaged thin layer screen with HPV - recommended age 30 - 65 years (select HPV order  below)  - HPV High Risk Types DNA Cervical    There are no discontinued medications.    Options for treatment and follow-up care were reviewed with the patient. Hetal Lainez  engaged in the decision making process and verbalized understanding of the options discussed and agreed with the final plan.    LUIS Andrwes CNP

## 2019-05-06 NOTE — NURSING NOTE
"Chief Complaint   Patient presents with     Vaginal Problem     Pt is having white discharge, fishy odor and itchiness from vaginal.      Establish Care     Pt is here to establish care.     Vital signs:      BP: 115/78 Pulse: 81     SpO2: 100 %     Height: 160 cm (5' 3\") Weight: 84.4 kg (186 lb 1.6 oz)  Estimated body mass index is 32.97 kg/m  as calculated from the following:    Height as of this encounter: 1.6 m (5' 3\").    Weight as of this encounter: 84.4 kg (186 lb 1.6 oz).    Lisset Landeros [facility]  10:40 AM 5/6/2019      "

## 2019-05-07 ENCOUNTER — TELEPHONE (OUTPATIENT)
Dept: INFECTIOUS DISEASES | Facility: CLINIC | Age: 34
End: 2019-05-07

## 2019-05-07 NOTE — TELEPHONE ENCOUNTER
Tried calling pt to remind her of appt tomorrow, but pt didn't answer, and VM box has not been set up yet.  Ama Baca RN

## 2019-05-09 LAB
COPATH REPORT: ABNORMAL
PAP: ABNORMAL

## 2019-05-13 ENCOUNTER — TELEPHONE (OUTPATIENT)
Dept: FAMILY MEDICINE | Facility: CLINIC | Age: 34
End: 2019-05-13

## 2019-05-13 DIAGNOSIS — R87.620 ASCUS WITH POSITIVE HIGH RISK HUMAN PAPILLOMAVIRUS OF VAGINA: Primary | ICD-10-CM

## 2019-05-13 DIAGNOSIS — R87.811 ASCUS WITH POSITIVE HIGH RISK HUMAN PAPILLOMAVIRUS OF VAGINA: Primary | ICD-10-CM

## 2019-05-13 LAB
FINAL DIAGNOSIS: ABNORMAL
HPV HR 12 DNA CVX QL NAA+PROBE: POSITIVE
HPV16 DNA SPEC QL NAA+PROBE: NEGATIVE
HPV18 DNA SPEC QL NAA+PROBE: NEGATIVE
SPECIMEN DESCRIPTION: ABNORMAL
SPECIMEN SOURCE CVX/VAG CYTO: ABNORMAL

## 2019-05-13 NOTE — LETTER
Patient:  Hetal Lainez  :   1985  MRN:     [id number]        Ms. Hetal Lainez  [address] [address]        May 15, 2019    Dear Ms. Lainez,    Thank you for choosing the [facility] for your healthcare needs.  We appreciate the opportunity to serve you.    The following are your recent test results.         Results for orders placed or performed in visit on 19   Pap imaged thin layer screen with HPV - recommended age 30 - 65 years (select HPV order below)   Result Value Ref Range    PAP ASC-US (A)     Copath Report         Patient Name: HETAL LAINEZ  MR#: [id number]  Specimen #: [id number]  Collected: 2019  Received: 2019  Reported: 2019 16:24  Ordering Phy(s): CURT SAMANIEGO    For improved result formatting, select 'View Enhanced Report Format' under   Linked Documents section.    SPECIMEN/STAIN PROCESS:  Pap imaged thin layer prep screening (Surepath, FocalPoint with guided   screening)       Pap-Cyto x 1, HPV ordered x 1    SOURCE: Cervical, endocervical  ----------------------------------------------------------------   Pap imaged thin layer prep screening (Surepath, FocalPoint with guided   screening)  SPECIMEN ADEQUACY:  Satisfactory for evaluation.  -Transformation zone component absent.    CYTOLOGIC INTERPRETATION:    Epithelial cell abnormality:  squamous cell:  atypical squamous cells-of   undetermined significance (ASC-US).    I have personally reviewed all specimens and/or slides, including the   listed special stains, and used them  with my medical judgment to determine th e final diagnosis.    Electronically signed out by:    Justice Haley M.D., [facility]    CLINICAL HISTORY:    Papanicolaou Test Limitations:  Cervical cytology is a screening test with   limited sensitivity; regular  screening is critical for cancer prevention; Pap tests are primarily   effective for the diagnosis/prevention  of  squamous cell carcinoma, not adenocarcinomas or other cancers.    The technical component of this testing was completed at the [facility], with the professional component performed   at the [facility], [address] [place] [address],   [place] [address] ([phone])    COLLECTION SITE:  Client:  [facility]  Location: [facility] (B)       HPV High Risk Types DNA Cervical   Result Value Ref Range    HPV Source SurePath     HPV 16 DNA Negative NEG^Negative    HPV 18 DNA Negative NEG^Negative    Other HR HPV Positive (A) NEG^Negative    Final Diagnosis       This patient's sample is positive for other HR HPV DNA (types 31, 33, 35, 39, 45, 51, 52,   56, 58, 59, 66 or 68), not HPV 16 or HPV 18 DNA. This result requires clinical correlation   with concurrent cytology findings.      Specimen Description Cervical Cells        Because of the changes on the pap and positive results for HPV you need to be seen by an OB/GYN provider and possibly have more testing completed. I have placed a referral for you to go to:   [facility]: [facility] [phone]   [email or web address]    Please call them to set up this appointment.     Please contact your provider if you have any questions or concerns.  We look forward to serving your needs in the future.      Sincerely,    Maya SMITH, CNP

## 2019-05-13 NOTE — TELEPHONE ENCOUNTER
Attempted to call Hetal to inform of results from pap (ASC-US) with positive HPV findings and need to go to OBGYN for colposcopy. Was prompted that patient has not set up voicemail on her cell phone yet. Will attempt a second time.   Maya Ramírez, APRN CNP  May 13, 2019

## 2019-05-15 NOTE — TELEPHONE ENCOUNTER
Attempted to reach Hetal again at cell phone number listed: no voicemail set up. Called home number and mother answered phone. Mother reported she is in [place] and Hetal is in [place] and likely not awake yet. Without signed YARI could not discuss anything related to patient. Mother stated she understood, would let Hetal know someone had called for her.     2nd attempt unsuccessful. Will send letter to patient with information for referral for OBGYN as well.   Maya Ramírez, APRN CNP  May 15, 2019

## 2019-09-14 ENCOUNTER — APPOINTMENT (OUTPATIENT)
Dept: GENERAL RADIOLOGY | Age: 34
End: 2019-09-14

## 2019-09-14 ENCOUNTER — HOSPITAL ENCOUNTER (EMERGENCY)
Age: 34
Discharge: HOME | End: 2019-09-14

## 2019-09-14 VITALS
SYSTOLIC BLOOD PRESSURE: 135 MMHG | WEIGHT: 180 LBS | HEART RATE: 81 BPM | OXYGEN SATURATION: 96 % | DIASTOLIC BLOOD PRESSURE: 79 MMHG | TEMPERATURE: 98.1 F | RESPIRATION RATE: 19 BRPM | BODY MASS INDEX: 31.89 KG/M2

## 2019-09-14 DIAGNOSIS — R05.9 COUGH: Primary | ICD-10-CM

## 2019-09-14 DIAGNOSIS — J02.9 ACUTE PHARYNGITIS, UNSPECIFIED ETIOLOGY: ICD-10-CM

## 2019-09-14 LAB
ALBUMIN SERPL-MCNC: 4.4 G/DL (ref 3.5–5.1)
ALP BLD-CCNC: 77 U/L (ref 38–126)
ALT SERPL-CCNC: 23 U/L (ref 11–66)
ANION GAP SERPL CALCULATED.3IONS-SCNC: 12 MEQ/L (ref 8–16)
AST SERPL-CCNC: 19 U/L (ref 5–40)
BASOPHILS # BLD: 0.4 %
BASOPHILS ABSOLUTE: 0 THOU/MM3 (ref 0–0.1)
BILIRUB SERPL-MCNC: 0.4 MG/DL (ref 0.3–1.2)
BILIRUBIN DIRECT: < 0.2 MG/DL (ref 0–0.3)
BUN BLDV-MCNC: 12 MG/DL (ref 7–22)
CALCIUM SERPL-MCNC: 9 MG/DL (ref 8.5–10.5)
CHLORIDE BLD-SCNC: 102 MEQ/L (ref 98–111)
CO2: 25 MEQ/L (ref 23–33)
CREAT SERPL-MCNC: 0.6 MG/DL (ref 0.4–1.2)
EOSINOPHIL # BLD: 2 %
EOSINOPHILS ABSOLUTE: 0.2 THOU/MM3 (ref 0–0.4)
ERYTHROCYTE [DISTWIDTH] IN BLOOD BY AUTOMATED COUNT: 12.5 % (ref 11.5–14.5)
ERYTHROCYTE [DISTWIDTH] IN BLOOD BY AUTOMATED COUNT: 43.5 FL (ref 35–45)
FLU A ANTIGEN: NEGATIVE
FLU B ANTIGEN: NEGATIVE
GFR SERPL CREATININE-BSD FRML MDRD: > 90 ML/MIN/1.73M2
GLUCOSE BLD-MCNC: 87 MG/DL (ref 70–108)
GROUP A STREP CULTURE, REFLEX: NEGATIVE
HCT VFR BLD CALC: 37 % (ref 37–47)
HEMOGLOBIN: 12.6 GM/DL (ref 12–16)
IMMATURE GRANS (ABS): 0.03 THOU/MM3 (ref 0–0.07)
IMMATURE GRANULOCYTES: 0 %
LYMPHOCYTES # BLD: 32.7 %
LYMPHOCYTES ABSOLUTE: 3 THOU/MM3 (ref 1–4.8)
MCH RBC QN AUTO: 32.8 PG (ref 26–33)
MCHC RBC AUTO-ENTMCNC: 34.1 GM/DL (ref 32.2–35.5)
MCV RBC AUTO: 96.4 FL (ref 81–99)
MONOCYTES # BLD: 5.6 %
MONOCYTES ABSOLUTE: 0.5 THOU/MM3 (ref 0.4–1.3)
NUCLEATED RED BLOOD CELLS: 0 /100 WBC
OSMOLALITY CALCULATION: 276.7 MOSMOL/KG (ref 275–300)
PLATELET # BLD: 271 THOU/MM3 (ref 130–400)
PMV BLD AUTO: 10.2 FL (ref 9.4–12.4)
POTASSIUM SERPL-SCNC: 3.5 MEQ/L (ref 3.5–5.2)
PREGNANCY, SERUM: NEGATIVE
PROCALCITONIN: 0.03 NG/ML (ref 0.01–0.09)
RBC # BLD: 3.84 MILL/MM3 (ref 4.2–5.4)
REFLEX THROAT C + S: NORMAL
SEG NEUTROPHILS: 59 %
SEGMENTED NEUTROPHILS ABSOLUTE COUNT: 5.5 THOU/MM3 (ref 1.8–7.7)
SODIUM BLD-SCNC: 139 MEQ/L (ref 135–145)
TOTAL PROTEIN: 6.8 G/DL (ref 6.1–8)
WBC # BLD: 9.3 THOU/MM3 (ref 4.8–10.8)

## 2019-09-14 PROCEDURE — 94640 AIRWAY INHALATION TREATMENT: CPT

## 2019-09-14 PROCEDURE — 84703 CHORIONIC GONADOTROPIN ASSAY: CPT

## 2019-09-14 PROCEDURE — 36415 COLL VENOUS BLD VENIPUNCTURE: CPT

## 2019-09-14 PROCEDURE — 6370000000 HC RX 637 (ALT 250 FOR IP): Performed by: PHYSICIAN ASSISTANT

## 2019-09-14 PROCEDURE — 87880 STREP A ASSAY W/OPTIC: CPT

## 2019-09-14 PROCEDURE — 71046 X-RAY EXAM CHEST 2 VIEWS: CPT

## 2019-09-14 PROCEDURE — 96372 THER/PROPH/DIAG INJ SC/IM: CPT

## 2019-09-14 PROCEDURE — 85025 COMPLETE CBC W/AUTO DIFF WBC: CPT

## 2019-09-14 PROCEDURE — 87070 CULTURE OTHR SPECIMN AEROBIC: CPT

## 2019-09-14 PROCEDURE — 80053 COMPREHEN METABOLIC PANEL: CPT

## 2019-09-14 PROCEDURE — 87804 INFLUENZA ASSAY W/OPTIC: CPT

## 2019-09-14 PROCEDURE — 6360000002 HC RX W HCPCS: Performed by: PHYSICIAN ASSISTANT

## 2019-09-14 PROCEDURE — 82248 BILIRUBIN DIRECT: CPT

## 2019-09-14 PROCEDURE — 84145 PROCALCITONIN (PCT): CPT

## 2019-09-14 PROCEDURE — 99283 EMERGENCY DEPT VISIT LOW MDM: CPT

## 2019-09-14 RX ORDER — IPRATROPIUM BROMIDE AND ALBUTEROL SULFATE 2.5; .5 MG/3ML; MG/3ML
1 SOLUTION RESPIRATORY (INHALATION) ONCE
Status: COMPLETED | OUTPATIENT
Start: 2019-09-14 | End: 2019-09-14

## 2019-09-14 RX ORDER — GUAIFENESIN 100 MG/5ML
200 SOLUTION ORAL EVERY 4 HOURS PRN
Status: DISCONTINUED | OUTPATIENT
Start: 2019-09-14 | End: 2019-09-14 | Stop reason: HOSPADM

## 2019-09-14 RX ORDER — AZITHROMYCIN 250 MG/1
TABLET, FILM COATED ORAL
Qty: 1 PACKET | Refills: 0 | Status: SHIPPED | OUTPATIENT
Start: 2019-09-14 | End: 2019-09-18

## 2019-09-14 RX ORDER — PREDNISONE 20 MG/1
20 TABLET ORAL 2 TIMES DAILY
Qty: 10 TABLET | Refills: 0 | Status: SHIPPED | OUTPATIENT
Start: 2019-09-14 | End: 2019-09-19

## 2019-09-14 RX ORDER — METHYLPREDNISOLONE SODIUM SUCCINATE 125 MG/2ML
80 INJECTION, POWDER, LYOPHILIZED, FOR SOLUTION INTRAMUSCULAR; INTRAVENOUS ONCE
Status: COMPLETED | OUTPATIENT
Start: 2019-09-14 | End: 2019-09-14

## 2019-09-14 RX ORDER — GUAIFENESIN/DEXTROMETHORPHAN 100-10MG/5
5 SYRUP ORAL 3 TIMES DAILY PRN
Qty: 120 ML | Refills: 0 | Status: SHIPPED | OUTPATIENT
Start: 2019-09-14 | End: 2019-09-24

## 2019-09-14 RX ORDER — METHYLPREDNISOLONE SODIUM SUCCINATE 125 MG/2ML
125 INJECTION, POWDER, LYOPHILIZED, FOR SOLUTION INTRAMUSCULAR; INTRAVENOUS ONCE
Status: DISCONTINUED | OUTPATIENT
Start: 2019-09-14 | End: 2019-09-14

## 2019-09-14 RX ADMIN — IPRATROPIUM BROMIDE AND ALBUTEROL SULFATE 1 AMPULE: .5; 3 SOLUTION RESPIRATORY (INHALATION) at 20:37

## 2019-09-14 RX ADMIN — METHYLPREDNISOLONE SODIUM SUCCINATE 80 MG: 125 INJECTION, POWDER, FOR SOLUTION INTRAMUSCULAR; INTRAVENOUS at 20:46

## 2019-09-14 ASSESSMENT — ENCOUNTER SYMPTOMS
RHINORRHEA: 1
BACK PAIN: 0
ABDOMINAL PAIN: 0
WHEEZING: 0
SORE THROAT: 1
DIARRHEA: 1
NAUSEA: 0
COUGH: 1
CONSTIPATION: 0
VOMITING: 0
SHORTNESS OF BREATH: 0

## 2019-09-15 ASSESSMENT — ENCOUNTER SYMPTOMS
BLOOD IN STOOL: 0
SINUS PRESSURE: 0
SINUS PAIN: 0
TROUBLE SWALLOWING: 0

## 2019-09-15 NOTE — ED PROVIDER NOTES
throat       Site: swab          Current Antibiotics: not stated   GROUP A STREP, REFLEX   HCG, SERUM, QUALITATIVE   BASIC METABOLIC PANEL   HEPATIC FUNCTION PANEL   PROCALCITONIN   ANION GAP   GLOMERULAR FILTRATION RATE, ESTIMATED   OSMOLALITY       EMERGENCY DEPARTMENT COURSE:   Vitals:    Vitals:    09/14/19 1937   BP: 135/79   Pulse: 81   Resp: 19   Temp: 98.1 °F (36.7 °C)   TempSrc: Oral   SpO2: 96%   Weight: 180 lb (81.6 kg)       8:13 PM: The patient was seen and evaluated. MDM:  The patient was seen within the ED today for the evaluation of cough and hemoptysis. The patient arrived in no acute distress and in stable condition. Within the department, I observed the patient's vital signs to be within acceptable range, and she was afebrile. On exam, I appreciated clear lung sounds. There is no chest wall or abdominal tenderness. CXR revealed no acute intrapulmonary process, infiltrations, effusions, or consolidations. Laboratory work is reassuring. Pregnancy screen is negative. Influenza and strep swabs were negative. Within the department, the patient was treated with Solumedrol, a Duoneb breathing treatment, and Robitussin for symptom management. I observed the patient's condition to improve during the duration of her stay. I explained my proposed course of treatment to the patient, who was amenable to my decision, and I answered all questions that were asked. She was discharged home in stable condition with prescriptions for a Zpak, Prednisone, and Robitussin, and the patient will return to the ED if her symptoms become more severe in nature or otherwise change. I advised the patient to follow-up with [address] [place] [address] [address] I also discussed return to ED precautions with the patient who verbalized understanding. CRITICAL CARE:   None     CONSULTS:  None     PROCEDURES:  None     FINAL IMPRESSION      1.  Cough    2. Acute pharyngitis, unspecified etiology          DISPOSITION/PLAN   I

## 2019-09-16 LAB — THROAT/NOSE CULTURE: NORMAL

## 2019-10-29 ENCOUNTER — TELEPHONE (OUTPATIENT)
Dept: INFECTIOUS DISEASES | Facility: CLINIC | Age: 34
End: 2019-10-29

## 2020-03-02 ENCOUNTER — HEALTH MAINTENANCE LETTER (OUTPATIENT)
Age: 35
End: 2020-03-02

## 2020-03-27 ENCOUNTER — TELEPHONE (OUTPATIENT)
Dept: PHARMACY | Facility: CLINIC | Age: 35
End: 2020-03-27

## 2020-03-27 NOTE — TELEPHONE ENCOUNTER
LM with Hetal's mother to have her call me. Pt is lost to follow up.    Princess Barnard, [place] Pharmacist.   [phone]

## 2020-08-24 ENCOUNTER — HOSPITAL ENCOUNTER (EMERGENCY)
Age: 35
Discharge: HOME | End: 2020-08-24
Payer: COMMERCIAL

## 2020-08-24 VITALS
HEART RATE: 86 BPM | BODY MASS INDEX: 33.66 KG/M2 | TEMPERATURE: 98.4 F | DIASTOLIC BLOOD PRESSURE: 72 MMHG | WEIGHT: 190 LBS | SYSTOLIC BLOOD PRESSURE: 138 MMHG | HEIGHT: 63 IN | OXYGEN SATURATION: 98 % | RESPIRATION RATE: 18 BRPM

## 2020-08-24 PROCEDURE — 99202 OFFICE O/P NEW SF 15 MIN: CPT | Performed by: NURSE PRACTITIONER

## 2020-08-24 PROCEDURE — 99213 OFFICE O/P EST LOW 20 MIN: CPT

## 2020-08-24 PROCEDURE — U0003 INFECTIOUS AGENT DETECTION BY NUCLEIC ACID (DNA OR RNA); SEVERE ACUTE RESPIRATORY SYNDROME CORONAVIRUS 2 (SARS-COV-2) (CORONAVIRUS DISEASE [COVID-19]), AMPLIFIED PROBE TECHNIQUE, MAKING USE OF HIGH THROUGHPUT TECHNOLOGIES AS DESCRIBED BY CMS-2020-01-R: HCPCS

## 2020-08-24 ASSESSMENT — ENCOUNTER SYMPTOMS
VOMITING: 0
DIARRHEA: 0
RHINORRHEA: 0
TROUBLE SWALLOWING: 0
SORE THROAT: 0
SINUS PRESSURE: 0
BACK PAIN: 0
NAUSEA: 0
COUGH: 1
SHORTNESS OF BREATH: 0
COLOR CHANGE: 0

## 2020-08-24 NOTE — ED PROVIDER NOTES
[facility]  Urgent Care Encounter       CHIEF COMPLAINT       Chief Complaint   Patient presents with    Covid Testing       Nurses Notes reviewed and I agree except as noted in the HPI. HISTORY OF PRESENT ILLNESS   Radha Monsivais is a 36 y.o. female who presents the urgent care center complaining of a nonproductive cough over the past 4 days. The patient states that she was exposed to her niece who tested positive for COVID. The patient denies any fever or chills. Patient states she has been drinking lots of water and taking vitamin C. Patient denies any ear pain, sore throat, shortness of breath, nausea, vomiting or diarrhea. The history is provided by the patient. No  was used. Cough   Cough characteristics:  Non-productive  Severity:  Moderate  Onset quality:  Sudden  Duration:  4 days  Timing:  Constant  Progression:  Unchanged  Chronicity:  New  Smoker: yes    Context: sick contacts    Relieved by:  None tried  Worsened by:  Nothing  Ineffective treatments:  None tried  Associated symptoms: no chest pain, no chills, no ear pain, no fever, no headaches, no rash, no rhinorrhea, no shortness of breath and no sore throat        REVIEW OF SYSTEMS     Review of Systems   Constitutional: Negative. Negative for activity change, appetite change, chills, fatigue and fever. HENT: Negative. Negative for congestion, ear pain, rhinorrhea, sinus pressure, sore throat and trouble swallowing. Respiratory: Positive for cough. Negative for shortness of breath. Cardiovascular: Negative. Negative for chest pain. Gastrointestinal: Negative for diarrhea, nausea and vomiting. Genitourinary: Negative for decreased urine volume and difficulty urinating. Musculoskeletal: Negative for back pain and neck stiffness. Skin: Negative for color change and rash. Allergic/Immunologic: Negative for environmental allergies.    Neurological: Negative for dizziness, light-headedness and headaches. Hematological: Negative for adenopathy. PAST MEDICAL HISTORY         Diagnosis Date    Abnormal Pap smear 2/2014    had colposcopy done    Pap smear 09/23/2010    negative for intraepithelial lesion/malignancy    Pap smear abnormality     History of abnormal pap    Smoker        SURGICALHISTORY     Patient  has no past surgical history on file. CURRENT MEDICATIONS       Previous Medications    No medications on file       ALLERGIES     Patient is has No Known Allergies. Patients There is no immunization history for the selected administration types on file for this patient. FAMILY HISTORY     Patient's family history includes Cancer in her mother; Heart Disease in her father. SOCIAL HISTORY     Patient  reports that she has been smoking cigarettes. She has a 5.00 pack-year smoking history. She has never used smokeless tobacco. She reports current alcohol use of about 2.0 standard drinks of alcohol per week. She reports that she does not use drugs. PHYSICAL EXAM     ED TRIAGE VITALS   , Temp: 98.4 °F (36.9 °C), Pulse: 91, Resp: 18, SpO2: 98 %,Estimated body mass index is 33.66 kg/m² as calculated from the following:    Height as of this encounter: 5' 3\" (1.6 m). Weight as of this encounter: 190 lb (86.2 kg). ,Patient's last menstrual period was 08/01/2020. Physical Exam  Vitals signs and nursing note reviewed. Constitutional:       General: She is not in acute distress. Appearance: Normal appearance. She is well-developed and well-groomed. She is not ill-appearing, toxic-appearing or diaphoretic. HENT:      Head: Normocephalic. Right Ear: Hearing, tympanic membrane, ear canal and external ear normal. No drainage, swelling or tenderness. No mastoid tenderness. Left Ear: Hearing, tympanic membrane, ear canal and external ear normal. No drainage, swelling or tenderness. No mastoid tenderness.       Nose: Nose normal.      Right Sinus: No maxillary sinus tenderness or frontal sinus tenderness. Left Sinus: No maxillary sinus tenderness or frontal sinus tenderness. Mouth/Throat:      Lips: Pink. Mouth: Mucous membranes are moist.      Pharynx: Uvula midline. No posterior oropharyngeal erythema or uvula swelling. Tonsils: No tonsillar exudate or tonsillar abscesses. Eyes:      Conjunctiva/sclera: Conjunctivae normal.      Pupils: Pupils are equal, round, and reactive to light. Neck:      Musculoskeletal: Full passive range of motion without pain and normal range of motion. No neck rigidity. Cardiovascular:      Rate and Rhythm: Normal rate and regular rhythm. Heart sounds: Normal heart sounds. Pulmonary:      Effort: Pulmonary effort is normal. No accessory muscle usage. Breath sounds: Normal breath sounds. No decreased breath sounds, wheezing, rhonchi or rales. Abdominal:      General: Bowel sounds are normal.      Palpations: Abdomen is soft. Tenderness: There is no abdominal tenderness. There is no right CVA tenderness, left CVA tenderness or guarding. Negative signs include Acrter's sign. Lymphadenopathy:      Head:      Right side of head: No submental, submandibular, tonsillar, preauricular, posterior auricular or occipital adenopathy. Left side of head: No submental, submandibular, tonsillar, preauricular, posterior auricular or occipital adenopathy. Cervical: No cervical adenopathy. Right cervical: No superficial, deep or posterior cervical adenopathy. Left cervical: No superficial, deep or posterior cervical adenopathy. Upper Body:      Right upper body: No supraclavicular adenopathy. Left upper body: No supraclavicular adenopathy. Skin:     General: Skin is warm and dry. Capillary Refill: Capillary refill takes less than 2 seconds. Findings: No rash. Neurological:      Mental Status: She is alert and oriented to person, place, and time.    Psychiatric: provider on file. No primary physician on file. DISCHARGE MEDICATIONS:  New Prescriptions    No medications on file       Discontinued Medications    No medications on file       There are no discharge medications for this patient.       LUIS ALFREDO Silva CNP    (Please note that portions of this note were completed with a voice recognition program. Efforts were made to edit the dictations but occasionally words are mis-transcribed.)           LUIS ALFREDO Silva CNP  08/24/20 2008

## 2020-08-24 NOTE — LETTER
NOTIFICATION RETURN TO WORK / SCHOOL    8/24/2020    Ms. Cisco العراقي  [address] [place] [address]      To Whom It May Concern:    Cisco العراقي was tested for COVID-19 on 8/24/2020    She should stay at home until the results of her test are back in 4 to 5 days. If there are questions or concerns, please have the patient contact our office.         Sincerely,  Electronically signed by LUIS ALFREDO Parikh CNP on 8/24/2020 at 8:03 PM      Ralph Vigil, LUIS ALFREDO - JUAN CARLOS

## 2020-08-25 ENCOUNTER — CARE COORDINATION (OUTPATIENT)
Dept: CARE COORDINATION | Age: 35
End: 2020-08-25

## 2020-08-25 NOTE — ED TRIAGE NOTES
Pt to [facility] ambulatory with productive cough and wanting a COVID-19 test.  Pt has been exposed by other family member this past Thursday. Pt has no fever or SOB.

## 2020-08-26 LAB — SARS-COV-2: NOT DETECTED

## 2020-08-26 NOTE — CARE COORDINATION
Attempted to reach patient for COVID 19 monitoring s/p recent UC visit. Patient not available per mother (who number belongs too). Mother will reach out to patient and have patient return call to [email or web address] number. No information provided as no HIPPA form on file for patient.
Attempted to reach patient for covid-19 f/u s/p recent UC visit for c/o cough and COVID-19 testing s/p possible exposure. COVID-19 testing was completed and results are pending. Patient was not available at the time of my call and generic voicemail message was left asking patient to please return call to my direct number. Will continue to work to f/u with patient in the future.
LOOP enrollment completed for Active Symptom Monitoring POC   Patient's preferred e-mail: [email or web address]    Patient's preferred phone number: [phone]   Based on Loop alert triggers, patient will be contacted by nurse care manager for worsening symptoms. Pt will be further monitored by COVID Loop Team based on severity of symptoms and risk factors. Patient called for covid-19 f/u s/p recent UC visit for cough and possible COVID-19 exposure. Patient denied any new/change/worsneing of symptoms. Patient denied any questions re: her discharge instructions. Patient verbalized awareness of need to self quarantine until her test results are returned. Covid-19 education was reviewed and patient verbalized understanding. Patient was reminded to call covid-19 hotline number with any new symptoms or questions/concerns. Patient verbalized understanding and she is aware of hotline information. Patient denied any other questions, concerns, or needs.

## 2020-09-04 ENCOUNTER — HOSPITAL ENCOUNTER (EMERGENCY)
Age: 35
Discharge: HOME | End: 2020-09-04
Attending: EMERGENCY MEDICINE
Payer: COMMERCIAL

## 2020-09-04 ENCOUNTER — APPOINTMENT (OUTPATIENT)
Dept: CT IMAGING | Age: 35
End: 2020-09-04
Payer: COMMERCIAL

## 2020-09-04 VITALS
RESPIRATION RATE: 16 BRPM | HEIGHT: 63 IN | DIASTOLIC BLOOD PRESSURE: 81 MMHG | TEMPERATURE: 97.5 F | WEIGHT: 190 LBS | HEART RATE: 80 BPM | BODY MASS INDEX: 33.66 KG/M2 | OXYGEN SATURATION: 98 % | SYSTOLIC BLOOD PRESSURE: 116 MMHG

## 2020-09-04 PROCEDURE — 99285 EMERGENCY DEPT VISIT HI MDM: CPT

## 2020-09-04 PROCEDURE — 72125 CT NECK SPINE W/O DYE: CPT

## 2020-09-04 PROCEDURE — 99284 EMERGENCY DEPT VISIT MOD MDM: CPT

## 2020-09-04 PROCEDURE — 6370000000 HC RX 637 (ALT 250 FOR IP): Performed by: STUDENT IN AN ORGANIZED HEALTH CARE EDUCATION/TRAINING PROGRAM

## 2020-09-04 PROCEDURE — 70450 CT HEAD/BRAIN W/O DYE: CPT

## 2020-09-04 RX ORDER — CYCLOBENZAPRINE HCL 10 MG
10 TABLET ORAL 3 TIMES DAILY PRN
Qty: 30 TABLET | Refills: 0 | Status: SHIPPED | OUTPATIENT
Start: 2020-09-04 | End: 2020-09-14

## 2020-09-04 RX ORDER — CYCLOBENZAPRINE HCL 10 MG
10 TABLET ORAL ONCE
Status: COMPLETED | OUTPATIENT
Start: 2020-09-04 | End: 2020-09-04

## 2020-09-04 RX ADMIN — CYCLOBENZAPRINE HYDROCHLORIDE 10 MG: 10 TABLET, FILM COATED ORAL at 17:22

## 2020-09-04 ASSESSMENT — ENCOUNTER SYMPTOMS
ABDOMINAL PAIN: 0
COUGH: 0
BACK PAIN: 0
EYE REDNESS: 0
SHORTNESS OF BREATH: 0
SORE THROAT: 0
DIARRHEA: 0
RHINORRHEA: 0
SINUS PAIN: 0
VOMITING: 0
NAUSEA: 0

## 2020-09-04 ASSESSMENT — PAIN DESCRIPTION - FREQUENCY: FREQUENCY: CONTINUOUS

## 2020-09-04 ASSESSMENT — PAIN DESCRIPTION - DESCRIPTORS: DESCRIPTORS: HEADACHE

## 2020-09-04 ASSESSMENT — PAIN DESCRIPTION - PAIN TYPE: TYPE: ACUTE PAIN

## 2020-09-04 ASSESSMENT — PAIN DESCRIPTION - ORIENTATION: ORIENTATION: POSTERIOR

## 2020-09-04 ASSESSMENT — PAIN SCALES - GENERAL: PAINLEVEL_OUTOF10: 7

## 2020-09-04 ASSESSMENT — PAIN DESCRIPTION - LOCATION: LOCATION: HEAD
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
## 2020-09-04 NOTE — ED NOTES
Bed: 036A  Expected date:   Expected time:   Means of arrival: [facility] Dept  Comments:     Shayy Cheng, NASIMA  09/04/20 1000

## 2020-09-04 NOTE — ED NOTES
Patient medicated per MAR at this time. Patient resting on cot, family at bedside. Lights turned off in room for patient comfort. No further needs expressed at this time.  Patient respirations easy and unlabored      Nick Cee RN  09/04/20 1482

## 2020-09-26 ENCOUNTER — HOSPITAL ENCOUNTER (OUTPATIENT)
Age: 35
Setting detail: SPECIMEN
Discharge: HOME | End: 2020-09-26
Payer: COMMERCIAL

## 2020-09-27 LAB
DIRECT EXAM: ABNORMAL
Lab: ABNORMAL
SPECIMEN DESCRIPTION: ABNORMAL

## 2020-11-23 ENCOUNTER — HOSPITAL ENCOUNTER (OUTPATIENT)
Age: 35
Setting detail: SPECIMEN
Discharge: HOME | End: 2020-11-23
Payer: COMMERCIAL

## 2020-11-23 LAB
DIRECT EXAM: ABNORMAL
Lab: ABNORMAL
SPECIMEN DESCRIPTION: ABNORMAL

## 2020-12-20 ENCOUNTER — HEALTH MAINTENANCE LETTER (OUTPATIENT)
Age: 35
End: 2020-12-20

## 2021-01-12 ENCOUNTER — HOSPITAL ENCOUNTER (OUTPATIENT)
Age: 36
Setting detail: SPECIMEN
Discharge: HOME | End: 2021-01-12
Payer: COMMERCIAL

## 2021-01-13 LAB
DIRECT EXAM: ABNORMAL
Lab: ABNORMAL
SOURCE: NORMAL
SPECIMEN DESCRIPTION: ABNORMAL
TRICHOMONAS VAGINALI, MOLECULAR: NEGATIVE

## 2021-01-14 LAB
C. TRACHOMATIS DNA ,URINE: NEGATIVE
N. GONORRHOEAE DNA, URINE: NEGATIVE
SPECIMEN DESCRIPTION: NORMAL

## 2021-04-24 ENCOUNTER — HEALTH MAINTENANCE LETTER (OUTPATIENT)
Age: 36
End: 2021-04-24

## 2021-06-09 ENCOUNTER — TELEPHONE (OUTPATIENT)
Dept: FAMILY MEDICINE | Facility: CLINIC | Age: 36
End: 2021-06-09

## 2021-06-09 ENCOUNTER — OFFICE VISIT (OUTPATIENT)
Dept: FAMILY MEDICINE | Facility: CLINIC | Age: 36
End: 2021-06-09
Payer: COMMERCIAL

## 2021-06-09 VITALS
HEIGHT: 64 IN | SYSTOLIC BLOOD PRESSURE: 106 MMHG | HEART RATE: 97 BPM | BODY MASS INDEX: 19.81 KG/M2 | TEMPERATURE: 99.3 F | OXYGEN SATURATION: 97 % | WEIGHT: 116 LBS | DIASTOLIC BLOOD PRESSURE: 64 MMHG

## 2021-06-09 DIAGNOSIS — R10.9 FLANK PAIN: Primary | ICD-10-CM

## 2021-06-09 DIAGNOSIS — N10 PYELONEPHRITIS, ACUTE: ICD-10-CM

## 2021-06-09 DIAGNOSIS — R50.9 FEVER, UNSPECIFIED FEVER CAUSE: ICD-10-CM

## 2021-06-09 LAB
ALBUMIN UR-MCNC: NEGATIVE MG/DL
APPEARANCE UR: CLEAR
BACTERIA #/AREA URNS HPF: ABNORMAL /HPF
BASOPHILS # BLD AUTO: 0 10E9/L (ref 0–0.2)
BASOPHILS NFR BLD AUTO: 0 %
BILIRUB UR QL STRIP: NEGATIVE
COLOR UR AUTO: YELLOW
DIFFERENTIAL METHOD BLD: ABNORMAL
EOSINOPHIL # BLD AUTO: 0 10E9/L (ref 0–0.7)
EOSINOPHIL NFR BLD AUTO: 0 %
ERYTHROCYTE [DISTWIDTH] IN BLOOD BY AUTOMATED COUNT: 12.3 % (ref 10–15)
GLUCOSE UR STRIP-MCNC: NEGATIVE MG/DL
HCT VFR BLD AUTO: 36.4 % (ref 35–47)
HGB BLD-MCNC: 12.6 G/DL (ref 11.7–15.7)
HGB UR QL STRIP: ABNORMAL
KETONES UR STRIP-MCNC: NEGATIVE MG/DL
LEUKOCYTE ESTERASE UR QL STRIP: ABNORMAL
LYMPHOCYTES # BLD AUTO: 0.5 10E9/L (ref 0.8–5.3)
LYMPHOCYTES NFR BLD AUTO: 2.6 %
MCH RBC QN AUTO: 32.5 PG (ref 26.5–33)
MCHC RBC AUTO-ENTMCNC: 34.6 G/DL (ref 31.5–36.5)
MCV RBC AUTO: 94 FL (ref 78–100)
MONOCYTES # BLD AUTO: 1.7 10E9/L (ref 0–1.3)
MONOCYTES NFR BLD AUTO: 8.5 %
NEUTROPHILS # BLD AUTO: 17.8 10E9/L (ref 1.6–8.3)
NEUTROPHILS NFR BLD AUTO: 88.9 %
NITRATE UR QL: NEGATIVE
PH UR STRIP: 5.5 PH (ref 5–7)
PLATELET # BLD AUTO: 167 10E9/L (ref 150–450)
RBC # BLD AUTO: 3.88 10E12/L (ref 3.8–5.2)
RBC #/AREA URNS AUTO: ABNORMAL /HPF
SOURCE: ABNORMAL
SP GR UR STRIP: <=1.005 (ref 1–1.03)
UROBILINOGEN UR STRIP-ACNC: 0.2 EU/DL (ref 0.2–1)
WBC # BLD AUTO: 20.1 10E9/L (ref 4–11)
WBC #/AREA URNS AUTO: >100 /HPF

## 2021-06-09 PROCEDURE — 81001 URINALYSIS AUTO W/SCOPE: CPT | Performed by: INTERNAL MEDICINE

## 2021-06-09 PROCEDURE — 99214 OFFICE O/P EST MOD 30 MIN: CPT | Performed by: INTERNAL MEDICINE

## 2021-06-09 PROCEDURE — 80053 COMPREHEN METABOLIC PANEL: CPT | Performed by: INTERNAL MEDICINE

## 2021-06-09 PROCEDURE — 86140 C-REACTIVE PROTEIN: CPT | Performed by: INTERNAL MEDICINE

## 2021-06-09 PROCEDURE — 87186 SC STD MICRODIL/AGAR DIL: CPT | Performed by: INTERNAL MEDICINE

## 2021-06-09 PROCEDURE — 36415 COLL VENOUS BLD VENIPUNCTURE: CPT | Performed by: INTERNAL MEDICINE

## 2021-06-09 PROCEDURE — 87088 URINE BACTERIA CULTURE: CPT | Performed by: INTERNAL MEDICINE

## 2021-06-09 PROCEDURE — 85025 COMPLETE CBC W/AUTO DIFF WBC: CPT | Performed by: INTERNAL MEDICINE

## 2021-06-09 PROCEDURE — 87086 URINE CULTURE/COLONY COUNT: CPT | Performed by: INTERNAL MEDICINE

## 2021-06-09 RX ORDER — CIPROFLOXACIN 500 MG/1
500 TABLET, FILM COATED ORAL 2 TIMES DAILY
Qty: 14 TABLET | Refills: 0 | Status: ON HOLD | OUTPATIENT
Start: 2021-06-09 | End: 2021-06-12

## 2021-06-09 ASSESSMENT — MIFFLIN-ST. JEOR: SCORE: 1211.17

## 2021-06-09 NOTE — TELEPHONE ENCOUNTER
I see pt has appt for today with Dr. Lambert. If she cannot wait for that as getting worse should go to ER

## 2021-06-09 NOTE — PROGRESS NOTES
Assessment & Plan   Problem List Items Addressed This Visit     None      Visit Diagnoses     Flank pain    -  Primary    Pyelonephritis, acute        Relevant Medications    ciprofloxacin (CIPRO) 500 MG tablet    Other Relevant Orders    CBC with platelets and differential (Completed)    Comprehensive metabolic panel (BMP + Alb, Alk Phos, ALT, AST, Total. Bili, TP) (Completed)    UA with Microscopic reflex to Culture (Completed)    Urine Culture Aerobic Bacterial (Completed)    CRP, inflammation (Completed)    Fever, unspecified fever cause            Start on Cipro 500 mg BID,  take probiotics with the antibiotics.,   Keep well-hydrated to flush a lot of fluids,   Advised patient to go to ER for parenteral antibiotics, patient elected not to go to ER and see how she responds to oral antibiotics first.  Advised if any worsening of systemic symptoms Including lethargy, chills, vomiting, worsening flank pain, persistent high fever go to the ER immediately for IV antibiotics.  If intolerance to oral antibiotics also go to the ER for parenteral antibiotics. Patient reiterated understanding.  We will check labs inflammatory markers CBC CRP, kidney function test as well as a urine culture.  Follow-up next 48 hours and as needed.           See Patient Instructions    Return in about 2 days (around 6/11/2021), or if symptoms worsen or fail to improve, for As needed and if symptoms worsen, other.    Lorri Lambert MD  [facility] NICHOLE Simon is a 35 year old who presents for the following health issues     HPI     Urinary concerns    Friday saturday had frequency  Sunday Monday was fine,     Yesterday started  having pain at end of urination,     yesterday had blood in urine in middle of day    This morning at 4:30 am, had fever and chills, Temp 102. And flank pain right sided.    Reports minimal nausea, no vomiting,     No diarrhea or constipation    No h/o uti      Took ibuprofen 3 of 200  "mg , early this morning took tylenol and advil    Has been Drinking adequate fluids        Review of Systems   Constitutional, HEENT, cardiovascular, pulmonary, gi and gu systems are negative, except as otherwise noted.      Objective    /64 (BP Location: Right arm, Patient Position: Sitting, Cuff Size: Adult Regular)   Pulse 97   Temp 99.3  F (37.4  C) (Oral)   Ht 1.626 m (5' 4\")   Wt 52.6 kg (116 lb)   SpO2 97%   BMI 19.91 kg/m    Body mass index is 19.91 kg/m .  Physical Exam   General appearance not in acute distress, alert oriented x3  Mucosa, moist pink ,no pallor.  Lungs clear to auscultation  Heart regular rate and rhythm, quite precordium  Abdomen soft ,nontender, nondistended  Positive right-sided CVA tenderness.  Extremities well-perfused      Office Visit on 07/23/2018   Component Date Value Ref Range Status     WBC 07/23/2018 5.9  4.0 - 11.0 10e9/L Final     RBC Count 07/23/2018 4.09  3.8 - 5.2 10e12/L Final     Hemoglobin 07/23/2018 13.1  11.7 - 15.7 g/dL Final     Hematocrit 07/23/2018 38.6  35.0 - 47.0 % Final     MCV 07/23/2018 94  78 - 100 fl Final     MCH 07/23/2018 32.0  26.5 - 33.0 pg Final     MCHC 07/23/2018 33.9  31.5 - 36.5 g/dL Final     RDW 07/23/2018 11.6  10.0 - 15.0 % Final     Platelet Count 07/23/2018 209  150 - 450 10e9/L Final     Vitamin B12 07/23/2018 383  193 - 986 pg/mL Final     Iron 07/23/2018 74  35 - 180 ug/dL Final     Iron Binding Cap 07/23/2018 292  240 - 430 ug/dL Final     Iron Saturation Index 07/23/2018 25  15 - 46 % Final     TSH 07/23/2018 0.84  0.40 - 4.00 mU/L Final     Folate 07/23/2018 19.6  >5.4 ng/mL Final     Vitamin D Deficiency screening 07/23/2018 45  20 - 75 ug/L Final    Comment: Season, race, dietary intake, and treatment affect the concentration of   25-hydroxy-Vitamin D. Values may decrease during winter months and increase   during summer months. Values 20-29 ug/L may indicate Vitamin D insufficiency   and values <20 ug/L may indicate " Vitamin D deficiency.  Vitamin D determination is routinely performed by an immunoassay specific for   25 hydroxyvitamin D3.  If an individual is on vitamin D2 (ergocalciferol)   supplementation, please specify 25 OH vitamin D2 and D3 level determination by   LCMSMS test [id number].       Sodium 07/23/2018 142  133 - 144 mmol/L Final     Potassium 07/23/2018 3.7  3.4 - 5.3 mmol/L Final     Chloride 07/23/2018 108  94 - 109 mmol/L Final     Carbon Dioxide 07/23/2018 29  20 - 32 mmol/L Final     Anion Gap 07/23/2018 5  3 - 14 mmol/L Final     Glucose 07/23/2018 73  70 - 99 mg/dL Final     Urea Nitrogen 07/23/2018 7  7 - 30 mg/dL Final     Creatinine 07/23/2018 0.76  0.52 - 1.04 mg/dL Final     GFR Estimate 07/23/2018 89  >60 mL/min/1.7m2 Final    Non  GFR Calc     GFR Estimate If Black 07/23/2018 >90  >60 mL/min/1.7m2 Final    African American GFR Calc     Calcium 07/23/2018 8.7  8.5 - 10.1 mg/dL Final     Bilirubin Total 07/23/2018 0.4  0.2 - 1.3 mg/dL Final     Albumin 07/23/2018 4.2  3.4 - 5.0 g/dL Final     Protein Total 07/23/2018 7.8  6.8 - 8.8 g/dL Final     Alkaline Phosphatase 07/23/2018 48  40 - 150 U/L Final     ALT 07/23/2018 21  0 - 50 U/L Final     AST 07/23/2018 21  0 - 45 U/L Final

## 2021-06-09 NOTE — TELEPHONE ENCOUNTER
Patient and partner Lambert called asking if can have an earlier appt.  Complaints of right sided flank pain, area tender to touch, x 2-3 days.   Chills and Fever today, highest at 102, taking ibuprofen which will bring fever down  Had dysuria but this has resolved, now foul smell and difficulty urinating  menses started yesterday.  Patient asking if should be seen sooner  RN unable to schedule with [facility].  Discussed with patient she does need to be seen today and it would be better to be seen sooner, patient declined UC.  Attempted to transfer call to [facility], unable to transfer call, call would not go through.  Patient will call [facility] to speak with RN staff about getting an earlier appt.  Will also route telephone call to [facility] triage to reach out to patient.  Unsure if can send to correct RN team , will also send to PCP  Ritika Iraheta RN  [facility]

## 2021-06-10 ENCOUNTER — NURSE TRIAGE (OUTPATIENT)
Dept: NURSING | Facility: CLINIC | Age: 36
End: 2021-06-10

## 2021-06-10 ENCOUNTER — APPOINTMENT (OUTPATIENT)
Dept: CT IMAGING | Facility: CLINIC | Age: 36
DRG: 872 | End: 2021-06-10
Attending: EMERGENCY MEDICINE
Payer: COMMERCIAL

## 2021-06-10 ENCOUNTER — HOSPITAL ENCOUNTER (INPATIENT)
Facility: CLINIC | Age: 36
LOS: 2 days | Discharge: HOME | DRG: 872 | End: 2021-06-12
Attending: EMERGENCY MEDICINE | Admitting: INTERNAL MEDICINE
Payer: COMMERCIAL

## 2021-06-10 DIAGNOSIS — N10 ACUTE PYELONEPHRITIS: ICD-10-CM

## 2021-06-10 DIAGNOSIS — R10.9 RIGHT FLANK PAIN: ICD-10-CM

## 2021-06-10 DIAGNOSIS — A41.9 SEPSIS, DUE TO UNSPECIFIED ORGANISM, UNSPECIFIED WHETHER ACUTE ORGAN DYSFUNCTION PRESENT (H): ICD-10-CM

## 2021-06-10 DIAGNOSIS — R11.2 NON-INTRACTABLE VOMITING WITH NAUSEA, UNSPECIFIED VOMITING TYPE: ICD-10-CM

## 2021-06-10 LAB
ALBUMIN SERPL-MCNC: 3.5 G/DL (ref 3.4–5)
ALBUMIN SERPL-MCNC: 3.8 G/DL (ref 3.4–5)
ALBUMIN UR-MCNC: 200 MG/DL
ALP SERPL-CCNC: 50 U/L (ref 40–150)
ALP SERPL-CCNC: 54 U/L (ref 40–150)
ALT SERPL W P-5'-P-CCNC: 17 U/L (ref 0–50)
ALT SERPL W P-5'-P-CCNC: 19 U/L (ref 0–50)
ANION GAP SERPL CALCULATED.3IONS-SCNC: 6 MMOL/L (ref 3–14)
ANION GAP SERPL CALCULATED.3IONS-SCNC: 7 MMOL/L (ref 3–14)
APPEARANCE UR: ABNORMAL
AST SERPL W P-5'-P-CCNC: 13 U/L (ref 0–45)
AST SERPL W P-5'-P-CCNC: 19 U/L (ref 0–45)
BASOPHILS # BLD AUTO: 0.1 10E9/L (ref 0–0.2)
BASOPHILS NFR BLD AUTO: 0.3 %
BILIRUB SERPL-MCNC: 0.6 MG/DL (ref 0.2–1.3)
BILIRUB SERPL-MCNC: 0.9 MG/DL (ref 0.2–1.3)
BILIRUB UR QL STRIP: NEGATIVE
BUN SERPL-MCNC: 11 MG/DL (ref 7–30)
BUN SERPL-MCNC: 13 MG/DL (ref 7–30)
CALCIUM SERPL-MCNC: 8.9 MG/DL (ref 8.5–10.1)
CALCIUM SERPL-MCNC: 9.1 MG/DL (ref 8.5–10.1)
CHLORIDE SERPL-SCNC: 101 MMOL/L (ref 94–109)
CHLORIDE SERPL-SCNC: 103 MMOL/L (ref 94–109)
CO2 SERPL-SCNC: 24 MMOL/L (ref 20–32)
CO2 SERPL-SCNC: 25 MMOL/L (ref 20–32)
COLOR UR AUTO: YELLOW
CREAT SERPL-MCNC: 0.95 MG/DL (ref 0.52–1.04)
CREAT SERPL-MCNC: 0.96 MG/DL (ref 0.52–1.04)
CRP SERPL-MCNC: 51 MG/L (ref 0–8)
DIFFERENTIAL METHOD BLD: ABNORMAL
EOSINOPHIL # BLD AUTO: 0 10E9/L (ref 0–0.7)
EOSINOPHIL NFR BLD AUTO: 0 %
ERYTHROCYTE [DISTWIDTH] IN BLOOD BY AUTOMATED COUNT: 12.1 % (ref 10–15)
GFR SERPL CREATININE-BSD FRML MDRD: 77 ML/MIN/{1.73_M2}
GFR SERPL CREATININE-BSD FRML MDRD: 77 ML/MIN/{1.73_M2}
GLUCOSE BLDC GLUCOMTR-MCNC: 81 MG/DL (ref 70–99)
GLUCOSE SERPL-MCNC: 104 MG/DL (ref 70–99)
GLUCOSE SERPL-MCNC: 115 MG/DL (ref 70–99)
GLUCOSE UR STRIP-MCNC: NEGATIVE MG/DL
HCG UR QL: NEGATIVE
HCT VFR BLD AUTO: 36.4 % (ref 35–47)
HGB BLD-MCNC: 12.1 G/DL (ref 11.7–15.7)
HGB UR QL STRIP: ABNORMAL
IMM GRANULOCYTES # BLD: 0.2 10E9/L (ref 0–0.4)
IMM GRANULOCYTES NFR BLD: 1.2 %
KETONES UR STRIP-MCNC: NEGATIVE MG/DL
LABORATORY COMMENT REPORT: NORMAL
LACTATE BLD-SCNC: 0.7 MMOL/L (ref 0.7–2)
LACTATE BLD-SCNC: 1.2 MMOL/L (ref 0.7–2)
LEUKOCYTE ESTERASE UR QL STRIP: ABNORMAL
LYMPHOCYTES # BLD AUTO: 0.8 10E9/L (ref 0.8–5.3)
LYMPHOCYTES NFR BLD AUTO: 4.1 %
MCH RBC QN AUTO: 30.8 PG (ref 26.5–33)
MCHC RBC AUTO-ENTMCNC: 33.2 G/DL (ref 31.5–36.5)
MCV RBC AUTO: 93 FL (ref 78–100)
MONOCYTES # BLD AUTO: 2.1 10E9/L (ref 0–1.3)
MONOCYTES NFR BLD AUTO: 10.6 %
MUCOUS THREADS #/AREA URNS LPF: PRESENT /LPF
NEUTROPHILS # BLD AUTO: 16.6 10E9/L (ref 1.6–8.3)
NEUTROPHILS NFR BLD AUTO: 83.8 %
NITRATE UR QL: NEGATIVE
NRBC # BLD AUTO: 0 10*3/UL
NRBC BLD AUTO-RTO: 0 /100
PH UR STRIP: 6.5 PH (ref 5–7)
PLATELET # BLD AUTO: 163 10E9/L (ref 150–450)
POTASSIUM SERPL-SCNC: 3.9 MMOL/L (ref 3.4–5.3)
POTASSIUM SERPL-SCNC: 4.2 MMOL/L (ref 3.4–5.3)
PROT SERPL-MCNC: 7.6 G/DL (ref 6.8–8.8)
PROT SERPL-MCNC: 7.8 G/DL (ref 6.8–8.8)
RBC # BLD AUTO: 3.93 10E12/L (ref 3.8–5.2)
RBC #/AREA URNS AUTO: >182 /HPF (ref 0–2)
SARS-COV-2 RNA RESP QL NAA+PROBE: NEGATIVE
SODIUM SERPL-SCNC: 133 MMOL/L (ref 133–144)
SODIUM SERPL-SCNC: 133 MMOL/L (ref 133–144)
SOURCE: ABNORMAL
SP GR UR STRIP: 1.03 (ref 1–1.03)
SPECIMEN SOURCE: NORMAL
SQUAMOUS #/AREA URNS AUTO: 3 /HPF (ref 0–1)
UROBILINOGEN UR STRIP-MCNC: 2 MG/DL (ref 0–2)
WBC # BLD AUTO: 19.8 10E9/L (ref 4–11)
WBC #/AREA URNS AUTO: 31 /HPF (ref 0–5)

## 2021-06-10 PROCEDURE — 250N000013 HC RX MED GY IP 250 OP 250 PS 637: Performed by: PHYSICIAN ASSISTANT

## 2021-06-10 PROCEDURE — 258N000003 HC RX IP 258 OP 636: Performed by: EMERGENCY MEDICINE

## 2021-06-10 PROCEDURE — 96365 THER/PROPH/DIAG IV INF INIT: CPT

## 2021-06-10 PROCEDURE — 99223 1ST HOSP IP/OBS HIGH 75: CPT | Mod: AI | Performed by: PHYSICIAN ASSISTANT

## 2021-06-10 PROCEDURE — 258N000003 HC RX IP 258 OP 636: Performed by: PHYSICIAN ASSISTANT

## 2021-06-10 PROCEDURE — 250N000011 HC RX IP 250 OP 636: Performed by: PHYSICIAN ASSISTANT

## 2021-06-10 PROCEDURE — 87635 SARS-COV-2 COVID-19 AMP PRB: CPT | Performed by: EMERGENCY MEDICINE

## 2021-06-10 PROCEDURE — 74177 CT ABD & PELVIS W/CONTRAST: CPT

## 2021-06-10 PROCEDURE — 36415 COLL VENOUS BLD VENIPUNCTURE: CPT

## 2021-06-10 PROCEDURE — 80053 COMPREHEN METABOLIC PANEL: CPT | Performed by: EMERGENCY MEDICINE

## 2021-06-10 PROCEDURE — 99285 EMERGENCY DEPT VISIT HI MDM: CPT | Mod: 25

## 2021-06-10 PROCEDURE — 87040 BLOOD CULTURE FOR BACTERIA: CPT | Performed by: EMERGENCY MEDICINE

## 2021-06-10 PROCEDURE — 96375 TX/PRO/DX INJ NEW DRUG ADDON: CPT

## 2021-06-10 PROCEDURE — 36415 COLL VENOUS BLD VENIPUNCTURE: CPT | Performed by: INTERNAL MEDICINE

## 2021-06-10 PROCEDURE — 96361 HYDRATE IV INFUSION ADD-ON: CPT

## 2021-06-10 PROCEDURE — 250N000009 HC RX 250: Performed by: EMERGENCY MEDICINE

## 2021-06-10 PROCEDURE — 120N000004 HC R&B MS OVERFLOW

## 2021-06-10 PROCEDURE — 83605 ASSAY OF LACTIC ACID: CPT | Performed by: INTERNAL MEDICINE

## 2021-06-10 PROCEDURE — 83605 ASSAY OF LACTIC ACID: CPT | Performed by: EMERGENCY MEDICINE

## 2021-06-10 PROCEDURE — C9803 HOPD COVID-19 SPEC COLLECT: HCPCS

## 2021-06-10 PROCEDURE — 87086 URINE CULTURE/COLONY COUNT: CPT | Performed by: EMERGENCY MEDICINE

## 2021-06-10 PROCEDURE — 81025 URINE PREGNANCY TEST: CPT | Performed by: EMERGENCY MEDICINE

## 2021-06-10 PROCEDURE — 250N000011 HC RX IP 250 OP 636: Performed by: EMERGENCY MEDICINE

## 2021-06-10 PROCEDURE — 999N001017 HC STATISTIC GLUCOSE BY METER IP

## 2021-06-10 PROCEDURE — 81001 URINALYSIS AUTO W/SCOPE: CPT | Performed by: EMERGENCY MEDICINE

## 2021-06-10 PROCEDURE — 85025 COMPLETE CBC W/AUTO DIFF WBC: CPT | Performed by: EMERGENCY MEDICINE

## 2021-06-10 RX ORDER — KETOROLAC TROMETHAMINE 15 MG/ML
15 INJECTION, SOLUTION INTRAMUSCULAR; INTRAVENOUS ONCE
Status: COMPLETED | OUTPATIENT
Start: 2021-06-10 | End: 2021-06-10

## 2021-06-10 RX ORDER — CEFTRIAXONE 2 G/1
2 INJECTION, POWDER, FOR SOLUTION INTRAMUSCULAR; INTRAVENOUS EVERY 24 HOURS
Status: DISCONTINUED | OUTPATIENT
Start: 2021-06-11 | End: 2021-06-12 | Stop reason: HOSPADM

## 2021-06-10 RX ORDER — ONDANSETRON 2 MG/ML
4 INJECTION INTRAMUSCULAR; INTRAVENOUS ONCE
Status: COMPLETED | OUTPATIENT
Start: 2021-06-10 | End: 2021-06-10

## 2021-06-10 RX ORDER — CEFTRIAXONE 2 G/1
2 INJECTION, POWDER, FOR SOLUTION INTRAMUSCULAR; INTRAVENOUS ONCE
Status: COMPLETED | OUTPATIENT
Start: 2021-06-10 | End: 2021-06-10

## 2021-06-10 RX ORDER — KETOROLAC TROMETHAMINE 15 MG/ML
15 INJECTION, SOLUTION INTRAMUSCULAR; INTRAVENOUS EVERY 6 HOURS PRN
Status: DISCONTINUED | OUTPATIENT
Start: 2021-06-10 | End: 2021-06-11

## 2021-06-10 RX ORDER — HYDROMORPHONE HYDROCHLORIDE 1 MG/ML
0.3 INJECTION, SOLUTION INTRAMUSCULAR; INTRAVENOUS; SUBCUTANEOUS
Status: DISCONTINUED | OUTPATIENT
Start: 2021-06-10 | End: 2021-06-10

## 2021-06-10 RX ORDER — OXYCODONE HYDROCHLORIDE 5 MG/1
5-10 TABLET ORAL
Status: DISCONTINUED | OUTPATIENT
Start: 2021-06-10 | End: 2021-06-11

## 2021-06-10 RX ORDER — CHOLECALCIFEROL (VITAMIN D3) 50 MCG
1 TABLET ORAL DAILY
COMMUNITY

## 2021-06-10 RX ORDER — HYDROMORPHONE HYDROCHLORIDE 1 MG/ML
.3-.5 INJECTION, SOLUTION INTRAMUSCULAR; INTRAVENOUS; SUBCUTANEOUS
Status: DISCONTINUED | OUTPATIENT
Start: 2021-06-10 | End: 2021-06-12 | Stop reason: HOSPADM

## 2021-06-10 RX ORDER — NALOXONE HYDROCHLORIDE 0.4 MG/ML
0.2 INJECTION, SOLUTION INTRAMUSCULAR; INTRAVENOUS; SUBCUTANEOUS
Status: DISCONTINUED | OUTPATIENT
Start: 2021-06-10 | End: 2021-06-12 | Stop reason: HOSPADM

## 2021-06-10 RX ORDER — NALOXONE HYDROCHLORIDE 0.4 MG/ML
0.4 INJECTION, SOLUTION INTRAMUSCULAR; INTRAVENOUS; SUBCUTANEOUS
Status: DISCONTINUED | OUTPATIENT
Start: 2021-06-10 | End: 2021-06-12 | Stop reason: HOSPADM

## 2021-06-10 RX ORDER — SODIUM CHLORIDE 9 MG/ML
INJECTION, SOLUTION INTRAVENOUS CONTINUOUS
Status: DISCONTINUED | OUTPATIENT
Start: 2021-06-10 | End: 2021-06-12

## 2021-06-10 RX ORDER — CALCIUM CARBONATE 500 MG/1
1000 TABLET, CHEWABLE ORAL 4 TIMES DAILY PRN
Status: DISCONTINUED | OUTPATIENT
Start: 2021-06-10 | End: 2021-06-12 | Stop reason: HOSPADM

## 2021-06-10 RX ORDER — ACETAMINOPHEN 650 MG/1
650 SUPPOSITORY RECTAL EVERY 4 HOURS PRN
Status: DISCONTINUED | OUTPATIENT
Start: 2021-06-10 | End: 2021-06-12 | Stop reason: HOSPADM

## 2021-06-10 RX ORDER — IBUPROFEN 200 MG
200-600 TABLET ORAL EVERY 4 HOURS PRN
COMMUNITY
End: 2021-07-07

## 2021-06-10 RX ORDER — ONDANSETRON 2 MG/ML
4 INJECTION INTRAMUSCULAR; INTRAVENOUS EVERY 6 HOURS PRN
Status: DISCONTINUED | OUTPATIENT
Start: 2021-06-10 | End: 2021-06-12 | Stop reason: HOSPADM

## 2021-06-10 RX ORDER — ONDANSETRON 4 MG/1
4 TABLET, ORALLY DISINTEGRATING ORAL EVERY 6 HOURS PRN
Status: DISCONTINUED | OUTPATIENT
Start: 2021-06-10 | End: 2021-06-12 | Stop reason: HOSPADM

## 2021-06-10 RX ORDER — IOPAMIDOL 755 MG/ML
59 INJECTION, SOLUTION INTRAVASCULAR ONCE
Status: COMPLETED | OUTPATIENT
Start: 2021-06-10 | End: 2021-06-10

## 2021-06-10 RX ORDER — AMOXICILLIN 250 MG
1 CAPSULE ORAL 2 TIMES DAILY PRN
Status: DISCONTINUED | OUTPATIENT
Start: 2021-06-10 | End: 2021-06-12 | Stop reason: HOSPADM

## 2021-06-10 RX ORDER — PROCHLORPERAZINE 25 MG
25 SUPPOSITORY, RECTAL RECTAL EVERY 12 HOURS PRN
Status: DISCONTINUED | OUTPATIENT
Start: 2021-06-10 | End: 2021-06-12 | Stop reason: HOSPADM

## 2021-06-10 RX ORDER — ACETAMINOPHEN 325 MG/1
650 TABLET ORAL EVERY 4 HOURS PRN
Status: DISCONTINUED | OUTPATIENT
Start: 2021-06-10 | End: 2021-06-12 | Stop reason: HOSPADM

## 2021-06-10 RX ORDER — AMOXICILLIN 250 MG
2 CAPSULE ORAL 2 TIMES DAILY PRN
Status: DISCONTINUED | OUTPATIENT
Start: 2021-06-10 | End: 2021-06-12 | Stop reason: HOSPADM

## 2021-06-10 RX ORDER — LIDOCAINE 40 MG/G
CREAM TOPICAL
Status: DISCONTINUED | OUTPATIENT
Start: 2021-06-10 | End: 2021-06-12 | Stop reason: HOSPADM

## 2021-06-10 RX ORDER — PROCHLORPERAZINE MALEATE 10 MG
10 TABLET ORAL EVERY 6 HOURS PRN
Status: DISCONTINUED | OUTPATIENT
Start: 2021-06-10 | End: 2021-06-12 | Stop reason: HOSPADM

## 2021-06-10 RX ADMIN — HYDROMORPHONE HYDROCHLORIDE 0.5 MG: 1 INJECTION, SOLUTION INTRAMUSCULAR; INTRAVENOUS; SUBCUTANEOUS at 19:42

## 2021-06-10 RX ADMIN — ONDANSETRON 4 MG: 2 INJECTION INTRAMUSCULAR; INTRAVENOUS at 09:08

## 2021-06-10 RX ADMIN — SODIUM CHLORIDE: 9 INJECTION, SOLUTION INTRAVENOUS at 19:36

## 2021-06-10 RX ADMIN — SODIUM CHLORIDE 60 ML: 9 INJECTION, SOLUTION INTRAVENOUS at 09:55

## 2021-06-10 RX ADMIN — KETOROLAC TROMETHAMINE 15 MG: 15 INJECTION, SOLUTION INTRAMUSCULAR; INTRAVENOUS at 09:08

## 2021-06-10 RX ADMIN — SODIUM CHLORIDE: 9 INJECTION, SOLUTION INTRAVENOUS at 13:12

## 2021-06-10 RX ADMIN — HYDROMORPHONE HYDROCHLORIDE 0.5 MG: 1 INJECTION, SOLUTION INTRAMUSCULAR; INTRAVENOUS; SUBCUTANEOUS at 13:20

## 2021-06-10 RX ADMIN — CEFTRIAXONE SODIUM 2 G: 2 INJECTION, POWDER, FOR SOLUTION INTRAMUSCULAR; INTRAVENOUS at 09:32

## 2021-06-10 RX ADMIN — SODIUM CHLORIDE 1000 ML: 9 INJECTION, SOLUTION INTRAVENOUS at 08:54

## 2021-06-10 RX ADMIN — IOPAMIDOL 59 ML: 755 INJECTION, SOLUTION INTRAVENOUS at 09:54

## 2021-06-10 RX ADMIN — ACETAMINOPHEN 650 MG: 325 TABLET, FILM COATED ORAL at 22:45

## 2021-06-10 RX ADMIN — ACETAMINOPHEN 650 MG: 325 TABLET, FILM COATED ORAL at 13:50

## 2021-06-10 ASSESSMENT — ENCOUNTER SYMPTOMS
DYSURIA: 1
VOMITING: 1
FLANK PAIN: 1
CHILLS: 1
FEVER: 1
NAUSEA: 1

## 2021-06-10 ASSESSMENT — MIFFLIN-ST. JEOR
SCORE: 1211.17
SCORE: 1190.77

## 2021-06-10 ASSESSMENT — ACTIVITIES OF DAILY LIVING (ADL)
ADLS_ACUITY_SCORE: 14
ADLS_ACUITY_SCORE: 14

## 2021-06-10 NOTE — TELEPHONE ENCOUNTER
"Patient's partner calling with patient.    Patient reporting she was seen yesterday and diagnosed with a kidney infection. Stating they were advised if pain worsened to come into the ED.    Patient started Cipro around 6 pm last evening taking second dose at 6 a.m. this morning.    Temp 99.9 O.    Reporting flank pain is increasing. Patient took Ibuprofen 600 mg at 6 a.m. with no improvement. Rating pain level \"8-9\" on 1-10 pain scale. Nausea. Denies vomiting. Patient is taking fluids.        Reviewed discharge instructions from clinic visit 6/9/21 Advised if any worsening of systemic symptoms  Including lethargy chills vomiting worsening flank pain persistent high fever go to the ER immediately for IV antibiotics.  If intolerance to oral antibiotics also go to the ER for parenteral antibiotics.    Stating she will go to [facility] ED.      Kalpana Adamson RN  [facility]      COVID 19 Nurse Triage Plan/Patient Instructions    Please be aware that novel coronavirus (COVID-19) may be circulating in the community. If you develop symptoms such as fever, cough, or SOB or if you have concerns about the presence of another infection including coronavirus (COVID-19), please contact your health care provider or visit [email or web address].     Disposition/Instructions    ED Visit recommended. Follow protocol based instructions.     Bring Your Own Device:  Please also bring your smart device(s) (smart phones, tablets, laptops) and their charging cables for your personal use and to communicate with your care team during your visit.    Thank you for taking steps to prevent the spread of this virus.  o Limit your contact with others.  o Wear a simple mask to cover your cough.  o Wash your hands well and often.    Resources    [facility]: About COVID-19: [email or web address]    CDC: What to Do If You're Sick: www.cdc.gov/coronavirus/2019-ncov/about/steps-when-sick.html    CDC: Ending " Home Isolation: www.cdc.gov/coronavirus/2019-ncov/hcp/disposition-in-home-patients.html     CDC: Caring for Someone: www.cdc.gov/coronavirus/2019-ncov/if-you-are-sick/care-for-someone.html     [facility]: Interim Guidance for Hospital Discharge to Home: www.[email or web address].[email or web address]/diseases/coronavirus/hcp/hospdischarge.pdf    [facility] clinical trials (COVID-19 research studies): [email or web address]-[email or web address]     Below are the COVID-19 hotlines at the [place] Department of [facility] ([facility]). Interpreters are available.   o For health questions: Call 791-418-5839 or 1-395.560.3951 (7 a.m. to 7 p.m.)  o For questions about schools and childcare: Call 252-561-0636 or 1-478.100.4349 (7 a.m. to 7 p.m.)                     Reason for Disposition    Nursing judgment    Additional Information    Negative: Nursing judgment    Negative: Information only call; adult is not ill or injured    Protocols used: NO GUIDELINE OR REFERENCE [id number]

## 2021-06-10 NOTE — ED TRIAGE NOTES
right flank pain since yesterday morning - worse pain today. +fevers/chills. +nausea. pt reports painful urination two days ago, but not anymore.

## 2021-06-10 NOTE — ED NOTES
[facility]  ED Nurse Handoff Report    ED Chief complaint: Flank Pain (right flank pain since yesterday morning - worse pain today. +fevers/chills. +nausea. pt reports painful urination two days ago, but not anymore. )      ED Diagnosis:   Final diagnoses:   Acute pyelonephritis   Non-intractable vomiting with nausea, unspecified vomiting type   Right flank pain   Sepsis, due to unspecified organism, unspecified whether acute organ dysfunction present (H)       Code Status:To be addressed by admitting provider    Allergies: No Known Allergies    Patient Story:   Right flank pain with urinary s/s for 2 days. Prescribed oral abx yesterday. Worsening fever, pain, and new nausea/vomiting prompting ED visit.     Focused Assessment:  A&Ox4. Denies cp, sob. Reports 5/10 R flank pain, denies urinary s/s in ED. Nausea resolved with Zofran. Soft BPS 90s/60s otherwise vss. Calm, cooperative and resting on cart.    Treatments and/or interventions provided:     Medications   HYDROmorphone (PF) (DILAUDID) injection 0.3 mg (has no administration in time range)   0.9% sodium chloride BOLUS (0 mLs Intravenous Stopped 6/10/21 0934)   ondansetron (ZOFRAN) injection 4 mg (4 mg Intravenous Given 6/10/21 0908)   cefTRIAXone (ROCEPHIN) 2 g vial to attach to  ml bag for ADULTS or NS 50 ml bag for PEDS (0 g Intravenous Stopped 6/10/21 1020)   ketorolac (TORADOL) injection 15 mg (15 mg Intravenous Given 6/10/21 0908)   iopamidol (ISOVUE-370) solution 59 mL (59 mLs Intravenous Given 6/10/21 0954)   Saline Flush (60 mLs Intravenous Given 6/10/21 0955)     Patient's response to treatments and/or interventions: decreased pain and nausea    To be done/followed up on inpatient unit:  Continue with plan of care per admitting MD.    Does this patient have any cognitive concerns?: NA    Activity level - Baseline/Home:  Independent  Activity Level - Current:   Independent    Patient's Preferred language: English    Needed?: No    Isolation: None  Infection: Not Applicable  Patient tested for COVID 19 prior to admission: YES  Bariatric?: No    Vital Signs:   Vitals:    06/10/21 0900 06/10/21 0920 06/10/21 1020 06/10/21 1030   BP: 98/72 92/60 (!) 81/51 90/55   Pulse: 111 98 90    Resp:       Temp:       TempSrc:       SpO2:   98% 97%   Weight:       Height:           Cardiac Rhythm:     Was the PSS-3 completed:   Yes  What interventions are required if any?               Family Comments: SO at bedside  OBS brochure/video discussed/provided to patient/family: N/A            For the majority of the shift this patient's behavior was Green.   Behavioral interventions performed were NA.    ED NURSE PHONE NUMBER: *[phone]

## 2021-06-10 NOTE — RESULT ENCOUNTER NOTE
Aurora, reviewed your urine microscopy test; is positive for leukocyte esterase and white blood cells which are inflammatory cells in the urine, although there is moderate blood in the urine with negative RBC (red blood cells].  The urine nitrite which is another test suggestive of urine tract infection is negative.  We will wait for the urine culture result.  Please continue on the full course of antibiotics prescribed.  Please follow the action plan discussed yesterday in the clinic.  Please keep well-hydrated.  Please send us an update on your clinical status.  Any further questions please let me know  Dr. Lambert

## 2021-06-10 NOTE — PROGRESS NOTES
Brief House Officer Note:  Called to evaluate for rigors, tachycardia, and fever. Upon my arrival Ms. Durant is well appearing, rigors have stopped. Upon EHR review she is on adequate antibiotic coverage, lactic acid 1.3. Explained the physiology of fever/sepsis. She appears to be adequately perfused and treated.     HERBERT Osborne, CNP  Hospitalist Service, House Officer  [facility]     Text Page  Pager: [phone]

## 2021-06-10 NOTE — ED NOTES
"Spoke to MD regarding BP 90s/60s and narcotic medication administration. Plan to hold medication at this time. Pt reports that pain is \"tolerable, not too awful\" and is agreeable to waiting.  "

## 2021-06-10 NOTE — PROGRESS NOTES
RECEIVING UNIT ED HANDOFF REVIEW    ED Nurse Handoff Report was reviewed by: Rosalio Harley RN on Betzy 10, 2021 at 12:22 PM

## 2021-06-10 NOTE — ED PROVIDER NOTES
History   Chief Complaint:  Flank Pain     HPI   Aurora Durant is a 35 year old female who presents with flank pain. The patient reports dysuria and flank pain. The patient reports that she had pain 5 and 6 days ago. She notes that she felt better 3 and 4 days ago. She reports that 2 days ago she started having dysuria. Yesterday she notes that she started having chills, fever, and nausea. She denies past bladder and kidney infection.  She notes that yesterday she went to her clinic where she was prescribed Cipro and she has taken one dose which was yesterday around 6 pm. She reports that she came in because the pain is twice as painful as yesterday. She notes that she vomited 30 minutes ago. She reports that she took 600 mg of ibuprofen but vomited that up. She reports that she is currently on her period. She notes that she has her appendix and gallbladder. 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     CBC 6/9/21       Ref Range & Units 1d ago  2yr ago    WBC    4.0 - 11.0 10e9/L 20.1High   5.9    RBC Count   3.8 - 5.2 10e12/L 3.88   4.09    Hemoglobin   11.7 - 15.7 g/dL 12.6   13.1    Hematocrit   35.0 - 47.0 %  36.4   38.6    MCV    78 - 100 fl  94   94    MCH    26.5 - 33.0 pg  32.5   32.0    MCHC    31.5 - 36.5 g/dL 34.6   33.9    RDW    10.0 - 15.0 %  12.3   11.6    Platelet Count   150 - 450 10e9/L 167   209    % Neutrophils   %   88.9     % Lymphocytes  %   2.6     % Monocytes   %   8.5     % Eosinophils   %   0.0     % Basophils   %   0.0     Absolute Neutrophil  1.6 - 8.3 10e9/L 17.8High      Absolute Lymphocytes 0.8 - 5.3 10e9/L 0.5Low      Absolute Monocytes  0.0 - 1.3 10e9/L 1.7High      Absolute Eosinophils  0.0 - 0.7 10e9/L 0.0     Absolute Basophils  0.0 - 0.2 10e9/L 0.0     Diff Method      Automated Method     UA with Microscopic reflex to Culture  6/10/21        Ref Range & Units  1d ago    Color Urine       Yellow    Appearance Urine      Clear    Glucose  "Urine   NEG^Negative mg/dL  Negative    Bilirubin Urine   NEG^Negative  Negative    Ketones Urine   NEG^Negative mg/dL  Negative    Specific Gravity   Urine 1.003 - 1.035  <=1.005    pH Urine   5.0 - 7.0 pH   5.5    Protein Albumin Urine  NEG^Negative mg/dL  Negative    Urobilinogen Urine  0.2 - 1.0 EU/dL  0.2    Nitrite Urine   NEG^Negative  Negative    Blood Urine   NEG^Negative  ModerateAbnormal     Leukocyte Esterase Urine NEG^Negative  LargeAbnormal     Source        Midstream Urine    WBC Urine   OTO5^0 - 5 /HPF  >100Abnormal     RBC Urine   OTO2^O - 2 /HPF  O - 2    Bacteria Urine   NEG^Negative /HPF  FewAbnormal        Review of Systems   Constitutional: Positive for chills and fever.   Gastrointestinal: Positive for nausea and vomiting.   Genitourinary: Positive for dysuria and flank pain.   All other systems reviewed and are negative.    Allergies:  No Known Allergies    Medications:  Cipro   Lysine     Past Medical History:    Anxiety   ASCUS with positive high risk HPV   GERD    Depression   Iritis   Vegan diet  Underweight    Past Surgical History:    Colposcopy cervix     Social History:  The patient presents with their partner. The patient is a .     Physical Exam     Patient Vitals for the past 24 hrs:   BP Temp Temp src Pulse Resp SpO2 Height Weight   06/10/21 1400 116/64 -- -- 109 20 -- -- --   06/10/21 1358 108/68 103.1  F (39.5  C) Oral 116 20 -- -- --   06/10/21 1346 (!) 217/179 103.1  F (39.5  C) Oral 120 20 -- -- --   06/10/21 1300 102/51 98.7  F (37.1  C) Oral 91 16 100 % 1.6 m (5' 3\") 52.2 kg (115 lb)   06/10/21 1200 -- -- -- -- -- 97 % -- --   06/10/21 1100 95/60 -- -- -- -- 96 % -- --   06/10/21 1030 90/55 -- -- -- -- 97 % -- --   06/10/21 1020 (!) 81/51 -- -- 90 -- 98 % -- --   06/10/21 0920 92/60 -- -- 98 -- -- -- --   06/10/21 0900 98/72 -- -- 111 -- -- -- --   06/10/21 0804 -- 98.5  F (36.9  C) Temporal 90 16 98 % 1.626 m (5' 4\") 52.6 kg (116 lb)     Physical " Exam  General: Alert, appears well-developed and well-nourished. Cooperative.     In mild distress  HEENT:  Head:  Atraumatic  Ears:  External ears are normal  Mouth/Throat:  Oropharynx is without erythema or exudate and mucous membranes are moist.   Eyes:   Conjunctivae normal and EOM are normal. No scleral icterus.  CV:  Tachycardic rate, regular rhythm, normal heart sounds and radial pulses are 2+ and symmetric.  No murmur.  Resp:  Breath sounds are clear bilaterally    Non-labored, no retractions or accessory muscle use  GI:  Abdomen is soft, no distension, no tenderness. No rebound or guarding.  Faint R CVA tenderness   MS:  Normal range of motion. No edema.    Normal strength in all 4 extremities.     Back atraumatic.    No midline cervical, thoracic, or lumbar tenderness  Skin:  Warm and dry.  No rash or lesions noted.  Neuro: Alert. Normal strength.  GCS: 15  Psych:  Normal mood and affect.    Emergency Department Course   Imaging:  CT Abdomen Pelvis w/ IV contrast:   Hypoenhancing area in the mid right kidney concerning for   pyelonephritis given the history. Consider ultrasound follow-up in 2   months to confirm resolution and to exclude an underlying lesion.   as per radiology.    Laboratory:  CBC: WBC 19.8 (H), HGB 12.1,    CMP: Glucose 115 (H), WNL (Creatinine: 0.96)  Lactic acid (Resulted 0919): 1.2  HCG Qualitative Urine: Negative   Blood Cultures x2: Pending    UA: Blood: Large (A), Protein Albumin: 200 (A), Leukocyte Esterase: Small (A), WBC: >182 31 (H), WBC clumps: Present, RBC: >182 (H), Squamous Epithelial: 3 (H), Mucous: Present, o/w Negative  Urine Culture Aerobic Bacterial: Pending    Asymptomatic COVID-19 PCR: Negative     Emergency Department Course:    Reviewed:  I reviewed nursing notes, vitals, past medical history and care everywhere    Assessments:  0857 I obtained history and examined the patient as noted above.   1039 I rechecked the patient and explained findings.      Consults:   1105 I spoke with Dr. Dangelo of the hospitalist services regarding admission.     Interventions:  0908 Toradol 15 mg, IV  0932 Rocephin 2 g, IV  0908 Zofran 4 mg, IV  0854 NS, 1 L, IV    Disposition:  The patient was admitted to the hospital under the care of Dr. Dangelo.       Impression & Plan   Medical Decision Making:  Aurora Durant is a 35 year old female who presents with symptoms of flank pain and dysuria.  Urinalysis confirms the infection and along with her exam this appears to be pyelonephritis. she is not pregnant.  There have been systemic symptoms of fever, vomiting, and right flank pain and thus blood cultures were sent.  There is no clinical evidence of perinephric abscess, emphysematous pyelonephritis, ureterolithiasis, appendicitis, colitis, diverticulitis or any intraabdominal catastrophe. The patient was admitted to the hospitalist Dr. Dangelo.    Covid-19  Aurora Durant was evaluated during a global COVID-19 pandemic, which necessitated consideration that the patient might be at risk for infection with the SARS-CoV-2 virus that causes COVID-19.   Applicable protocols for evaluation were followed during the patient's care.   COVID-19 was considered as part of the patient's evaluation. The plan for testing is:  a test was obtained during this visit.       Diagnosis:    ICD-10-CM    1. Acute pyelonephritis  N10 Blood culture     Blood culture     Urine Culture Aerobic Bacterial     Asymptomatic SARS-CoV-2 COVID-19 Virus (Coronavirus) by PCR     Glucose by meter     Glucose by meter   2. Non-intractable vomiting with nausea, unspecified vomiting type  R11.2    3. Right flank pain  R10.9    4. Sepsis, due to unspecified organism, unspecified whether acute organ dysfunction present (H)  A41.9        Scribe Disclosure:  I, Caterina Willingham, am serving as a scribe at 8:44 AM on 6/10/2021 to document services personally performed by Cipriano Snyder MD based on my observations  and the provider's statements to me.        Cipriano Snyder MD  06/10/21 3365

## 2021-06-10 NOTE — PLAN OF CARE
Pt arrived from ED  today.inpatient status. Covid negative. Very pleasant.  Pt is a/o. VSS. Tmax at 103.1, rigors, chills, tachycardiac. Tylenol given. Pt had emesis X 1. Dilaudid given for pain with good relief. Iv infusing at 150/ hr. Pt is up with SBA.  BC/UC pending. On IV abx. Continue to monitor.

## 2021-06-10 NOTE — H&P
[facility]    History and Physical  Hospitalist       Date of Admission:  6/10/2021  Date of Service (when I saw the patient): 06/10/21    Assessment & Plan   Aurora Durant is a very pleasant 35 year old female with a past medical history of depression, anxiety, and GERD who presents with several days of right flank pain, abdominal pain, dysuria, nausea and vomiting.  Found to have acute pyelonephritis with evidence of sepsis, and is admitted for further management.    Sepsis due to acute pyelonephritis  Patient presents with 5-6 days of right flank pain and intermittent dysuria, along with fevers.  Yesterday she started having chills along with worsening nausea and pain, and saw her PCP where she was prescribed Cipro 500 mg twice daily.  She has taken 2 doses of this.    She came to the ER now because her pain has significantly worsened since yesterday.  Temperature at home as high as 102.  Here she is afebrile.  Took 600 mg of ibuprofen prior to presentation.  Mildly tachycardic here with heart rate 100, BP low at 81/51, WBC elevated at 19.8.  Lactic acid is 1.2.  Urinalysis with large blood, small leukocyte esterase, 31 WBCs.  Urine and blood cultures collected.  CT abdomen pelvis with contrast shows hypoenhancing area in the mid right kidney concerning for pyelonephritis given the history.  Started on IV ceftriaxone and admitted to the hospital due to concern for sepsis.  --Continue IV ceftriaxone 2 g every 24 hours  --Follow-up urine and blood cultures  --IV fluid hydration, normal saline at 150 mL/h  --Pain control with Toradol every 6 hours as needed, Tylenol as needed, oxycodone 5-10 mg every 3 hours as needed, IV Dilaudid 0.3-0.5 mg every 2 hours as needed.  --Antiemetics ordered  --Monitor telemetry  --Monitor for worsening signs of sepsis, fevers, repeat CBC in a.m.    Asymptomatic COVID-19 testing is negative    Noted that patient is a , and had a previous  obligation to photograph a wedding tomorrow.  She would like to discharge if possible, but understands that she needs admission for IV medications and hydration to adequately treat her sepsis.      Diet: Advance to regular diet as tolerated  DVT Prophylaxis: PCD's  Guerrero Catheter: not present  Code Status: Full code  Disposition Plan    Inpatient status    The patient has been discussed with Dr. Dangelo, who agrees with the assessment and plan at this time.     Edwardo Chamberlain    Primary Care Physician   Ariana Garcia    Chief Complaint   Right flank pain, nausea and vomiting    History is obtained from the patient    History of Present Illness   Aurora Durant is a very pleasant 35 year old female with a past medical history of depression, anxiety, and GERD who presents with several days of right flank pain, abdominal pain, dysuria, nausea and vomiting.    Patient presents with 5-6 days of right flank pain and intermittent dysuria, along with fevers.  Yesterday she started having chills along with worsening nausea and pain, and saw her PCP where she was prescribed Cipro 500 mg twice daily.  She has taken 2 doses of this.  She denies any dark urine.  She began vomiting today, unable to keep down medication or food.  She is currently on her period.    She came to the ER now because her pain symptoms have significantly worsened since yesterday.  Temperature at home as high as 102.  Here she is afebrile.  Took 600 mg of ibuprofen prior to presentation.  Mildly tachycardic here with heart rate 100, BP low at 81/51, WBC elevated at 20.  Lactic acid is 1.2.  Urinalysis with large blood, small leukocyte esterase, 31 WBCs.  Urine and blood cultures collected.  CT abdomen/pelvis with contrast shows hypoenhancing area in the mid right kidney concerning for pyelonephritis given the history.  Started on IV ceftriaxone and admitted to the hospital due to concern for sepsis.    Noted that patient is a ,  and had a previous obligation to photograph a wedding tomorrow.  She would like to discharge if possible, but understands that she needs admission for IV medications and hydration to adequately treat her sepsis.    Past Medical History    I have reviewed this patient's medical history and updated it with pertinent information if needed.   Past Medical History:   Diagnosis Date     Anxiety      ASCUS with positive high risk HPV 12-3-10     GERD (gastroesophageal reflux disease)      History of depression      Iritis 2012       Past Surgical History   I have reviewed this patient's surgical history and updated it with pertinent information if needed.  Past Surgical History:   Procedure Laterality Date     COLPOSCOPY CERVIX, LOOP ELECTRODE BIOPSY, COMBINED  3-25-11    [place]     NO HISTORY OF SURGERY         Prior to Admission Medications   Prior to Admission Medications   Prescriptions Last Dose Informant Patient Reported? Taking?   Cyanocobalamin (B-12) 1000 MCG TBCR 6/9/2021 at Unknown time  Yes Yes   Sig: Take 1 tablet by mouth daily    Probiotic Product (PROBIOTIC PO) 6/9/2021 at Unknown time  Yes Yes   Sig: Take 1 tablet by mouth daily   UNABLE TO FIND 6/9/2021 at Unknown time  Yes Yes   Sig: MEDICATION NAME: apple cider   UNABLE TO FIND 6/9/2021 at Unknown time  Yes Yes   Sig: MEDICATION NAME: collagen   ciprofloxacin (CIPRO) 500 MG tablet 6/10/2021 at AM  No Yes   Sig: Take 1 tablet (500 mg) by mouth 2 times daily   ibuprofen (ADVIL/MOTRIN) 200 MG tablet   Yes Yes   Sig: Take 200-600 mg by mouth every 4 hours as needed for mild pain   vitamin D3 (CHOLECALCIFEROL) 50 mcg (2000 units) tablet 6/9/2021 at Unknown time  Yes Yes   Sig: Take 1 tablet by mouth daily      Facility-Administered Medications: None     Allergies   No Known Allergies    Social History   I have reviewed this patient's social history and updated it with pertinent information if needed.  Patient is a non-smoker.  She drinks alcohol seldomly.   She sometimes will use marijuana edibles, but otherwise denies any illicit drug use.    Family History   Family history reviewed with patient and is noncontributory to this presentation.    Review of Systems   The 10 point Review of Systems is negative other than noted in the HPI or here. Denies headache, lightheadedness, chest pain, cough, shortness of breath, diarrhea, focal weakness or numbness, or rash. Does report fever and chills, abdominal pain, right flank pain, dysuria, frequency, nausea and vomiting.    Physical Exam   Temp: 98.5  F (36.9  C) Temp src: Temporal BP: 95/60 Pulse: 90   Resp: 16 SpO2: 96 % O2 Device: None (Room air)    Vital Signs with Ranges  Temp:  [98.5  F (36.9  C)-99.3  F (37.4  C)] 98.5  F (36.9  C)  Pulse:  [] 90  Resp:  [16] 16  BP: ()/(51-72) 95/60  SpO2:  [96 %-98 %] 96 %  116 lbs 0 oz    Constitutional: Ill-appearing female, awake, alert, cooperative, lying on the gurney in NAD.    ENT: Normocephalic, without obvious abnormality, atraumatic, oropharynx with moist mucous membranes.  Eyes pupils are equal and round; extra occular movements grossly intact.  Normal sclera.    Neck: Supple  Pulmonary: No increased work of breathing, good air exchange, clear to auscultation bilaterally, no crackles or wheezing.  Cardiovascular: Mildly tachycardic and regular, normal S1 and S2, and no murmur noted.  GI: Abdomen soft, diffuse tenderness but greatest on the RLQ, no rigidity or guarding, normal BS.  Skin/Integumen: Warm, dry, no rashes on exposed skin.  Neuro: CN II-XII grossly intact. Moves all 4 extremities equally with normal strength and coordination.  Psych:  Alert and oriented to self, place, date, situation. Normal affect.  Extremities: No lower extremity edema noted. Dorsal pedal pulses and posterior tibial pulses palpable.      Data   Data reviewed today:  I personally reviewed labs and imaging results.  Recent Labs   Lab 06/10/21  0853 06/09/21  1643   WBC 19.8* 20.1*    HGB 12.1 12.6   MCV 93 94    167     --    POTASSIUM 3.9  --    CHLORIDE 103  --    CO2 24  --    BUN 11  --    CR 0.96  --    ANIONGAP 6  --    JEREMIE 8.9  --    *  --    ALBUMIN 3.5  --    PROTTOTAL 7.6  --    BILITOTAL 0.6  --    ALKPHOS 54  --    ALT 17  --    AST 13  --        Recent Results (from the past 24 hour(s))   CT Abdomen Pelvis w Contrast    Narrative    CT ABDOMEN AND PELVIS WITH CONTRAST 6/10/2021 10:03 AM    CLINICAL HISTORY: Abdominal pain. Flank pain, kidney stone suspected.  Right flank pain with fever.  Concern for pyelonephritis vs infected  stone.    TECHNIQUE: CT scan of the abdomen and pelvis was performed following  injection of IV contrast. Multiplanar reformats were obtained. Dose  reduction techniques were used.  CONTRAST: 59 mL [id number]    COMPARISON: None.    FINDINGS:   LOWER CHEST: No infiltrates or effusions.    HEPATOBILIARY: No significant mass or bile duct dilatation. No  calcified gallstones.     PANCREAS: No significant mass, duct dilatation, or inflammatory  change.    SPLEEN: Normal size.    ADRENAL GLANDS: No significant nodules.    KIDNEYS/BLADDER: Hypoenhancing area in the mid right kidney with  perinephric and periureteral stranding compatible with right  pyelonephritis. No definite left pyelonephritis. No definite  urolithiasis, tiny stones can be obscured by contrast.    BOWEL: No obstruction or inflammatory change.    PELVIC ORGANS: No pelvic masses. Foreign body in the vagina,  presumably a pelvic floor stabilizer.    ADDITIONAL FINDINGS: Small amount of pelvic free fluid which is  nonspecific but likely physiologic.    MUSCULOSKELETAL: Survey of the visualized bony structures demonstrates  no destructive bony lesions.      Impression    IMPRESSION: Hypoenhancing area in the mid right kidney concerning for  pyelonephritis given the history. Consider ultrasound follow-up in 2  months to confirm resolution and to exclude an underlying lesion.

## 2021-06-10 NOTE — PHARMACY-ADMISSION MEDICATION HISTORY
Pharmacy Medication History  Admission medication history interview status for the 6/10/2021  admission is complete. See EPIC admission navigator for prior to admission medications     Location of Interview: Patient room  Medication history sources: Patient    Significant changes made to the medication list:  Removed lysine  Added ibuprofen and probiotic    In the past week, patient estimated taking medication this percent of the time: greater than 90%    Additional medication history information:   None    Medication reconciliation completed by provider prior to medication history? No    Time spent in this activity: 5 minutes    Prior to Admission medications    Medication Sig Last Dose Taking? Auth Provider   ciprofloxacin (CIPRO) 500 MG tablet Take 1 tablet (500 mg) by mouth 2 times daily 6/10/2021 at AM Yes Lorri Lambert MD   Cyanocobalamin (B-12) 1000 MCG TBCR Take 1 tablet by mouth daily  6/9/2021 at Unknown time Yes Reported, Patient   ibuprofen (ADVIL/MOTRIN) 200 MG tablet Take 200-600 mg by mouth every 4 hours as needed for mild pain  Yes Unknown, Entered By History   Probiotic Product (PROBIOTIC PO) Take 1 tablet by mouth daily 6/9/2021 at Unknown time Yes Unknown, Entered By History   UNABLE TO FIND MEDICATION NAME: apple cider 6/9/2021 at Unknown time Yes Reported, Patient   UNABLE TO FIND MEDICATION NAME: collagen 6/9/2021 at Unknown time Yes Reported, Patient   vitamin D3 (CHOLECALCIFEROL) 50 mcg (2000 units) tablet Take 1 tablet by mouth daily 6/9/2021 at Unknown time Yes Unknown, Entered By History       The information provided in this note is only as accurate as the sources available at the time of update(s) ,h

## 2021-06-10 NOTE — RESULT ENCOUNTER NOTE
Labs reviewed, CBC shows elevated white blood cell count with left shift and elevated CRP inflammatory marker . Patient was started on Cipro oral antibiotics,  Patient is currently admitted to the hospital for IV antibiotics.  As advised if any worsening symptoms or lack of response to oral antibiotic to go to the ER immediately and get started on IV [parenteral] antibiotics.    Chemistry shows normal electrolytes, stable kidney function test, normal calcium and normal liver enzymes.  Urine culture is pending.  Hemoglobin hematocrit normal and normal platelet count.  Dr Lambert

## 2021-06-10 NOTE — PROGRESS NOTES
A/Ox4 on RA.  Regular diet.On TELE. Sinus tach. Fever 102.9. Reports chills and nausea. Patient stated she vomited possible tylenol. Administered 0.5 mg dilaudid. Refused PRN zofran. RRT called at 1350 for tachy, chills and tremors.RRT later cancelled. LR infusing at 150 ml/hr.

## 2021-06-11 LAB
ANION GAP SERPL CALCULATED.3IONS-SCNC: 6 MMOL/L (ref 3–14)
BACTERIA SPEC CULT: NO GROWTH
BUN SERPL-MCNC: 9 MG/DL (ref 7–30)
CALCIUM SERPL-MCNC: 7.8 MG/DL (ref 8.5–10.1)
CHLORIDE SERPL-SCNC: 111 MMOL/L (ref 94–109)
CO2 SERPL-SCNC: 21 MMOL/L (ref 20–32)
CREAT SERPL-MCNC: 0.85 MG/DL (ref 0.52–1.04)
ERYTHROCYTE [DISTWIDTH] IN BLOOD BY AUTOMATED COUNT: 12.7 % (ref 10–15)
GFR SERPL CREATININE-BSD FRML MDRD: 89 ML/MIN/{1.73_M2}
GLUCOSE SERPL-MCNC: 92 MG/DL (ref 70–99)
HCT VFR BLD AUTO: 30.5 % (ref 35–47)
HGB BLD-MCNC: 9.9 G/DL (ref 11.7–15.7)
MCH RBC QN AUTO: 30.7 PG (ref 26.5–33)
MCHC RBC AUTO-ENTMCNC: 32.5 G/DL (ref 31.5–36.5)
MCV RBC AUTO: 95 FL (ref 78–100)
PLATELET # BLD AUTO: 127 10E9/L (ref 150–450)
POTASSIUM SERPL-SCNC: 4 MMOL/L (ref 3.4–5.3)
RBC # BLD AUTO: 3.22 10E12/L (ref 3.8–5.2)
SODIUM SERPL-SCNC: 138 MMOL/L (ref 133–144)
SPECIMEN SOURCE: NORMAL
WBC # BLD AUTO: 14.1 10E9/L (ref 4–11)

## 2021-06-11 PROCEDURE — 85027 COMPLETE CBC AUTOMATED: CPT | Performed by: PHYSICIAN ASSISTANT

## 2021-06-11 PROCEDURE — 99232 SBSQ HOSP IP/OBS MODERATE 35: CPT | Performed by: INTERNAL MEDICINE

## 2021-06-11 PROCEDURE — 258N000003 HC RX IP 258 OP 636: Performed by: INTERNAL MEDICINE

## 2021-06-11 PROCEDURE — 36415 COLL VENOUS BLD VENIPUNCTURE: CPT | Performed by: PHYSICIAN ASSISTANT

## 2021-06-11 PROCEDURE — 80048 BASIC METABOLIC PNL TOTAL CA: CPT | Performed by: PHYSICIAN ASSISTANT

## 2021-06-11 PROCEDURE — 258N000003 HC RX IP 258 OP 636: Performed by: PHYSICIAN ASSISTANT

## 2021-06-11 PROCEDURE — 120N000004 HC R&B MS OVERFLOW

## 2021-06-11 PROCEDURE — 99207 PR CDG-MDM COMPONENT: MEETS MODERATE - DOWN CODED: CPT | Performed by: INTERNAL MEDICINE

## 2021-06-11 PROCEDURE — 250N000013 HC RX MED GY IP 250 OP 250 PS 637: Performed by: PHYSICIAN ASSISTANT

## 2021-06-11 PROCEDURE — 250N000011 HC RX IP 250 OP 636: Performed by: PHYSICIAN ASSISTANT

## 2021-06-11 PROCEDURE — 250N000013 HC RX MED GY IP 250 OP 250 PS 637: Performed by: INTERNAL MEDICINE

## 2021-06-11 RX ORDER — IBUPROFEN 400 MG/1
400 TABLET, FILM COATED ORAL 3 TIMES DAILY PRN
Status: DISCONTINUED | OUTPATIENT
Start: 2021-06-11 | End: 2021-06-12 | Stop reason: HOSPADM

## 2021-06-11 RX ORDER — HYDROCODONE BITARTRATE AND ACETAMINOPHEN 5; 325 MG/1; MG/1
1 TABLET ORAL EVERY 6 HOURS PRN
Status: DISCONTINUED | OUTPATIENT
Start: 2021-06-11 | End: 2021-06-12 | Stop reason: HOSPADM

## 2021-06-11 RX ADMIN — CEFTRIAXONE SODIUM 2 G: 2 INJECTION, POWDER, FOR SOLUTION INTRAMUSCULAR; INTRAVENOUS at 08:48

## 2021-06-11 RX ADMIN — SODIUM CHLORIDE: 9 INJECTION, SOLUTION INTRAVENOUS at 19:58

## 2021-06-11 RX ADMIN — ONDANSETRON 4 MG: 2 INJECTION INTRAMUSCULAR; INTRAVENOUS at 06:13

## 2021-06-11 RX ADMIN — HYDROMORPHONE HYDROCHLORIDE 0.5 MG: 1 INJECTION, SOLUTION INTRAMUSCULAR; INTRAVENOUS; SUBCUTANEOUS at 06:13

## 2021-06-11 RX ADMIN — ACETAMINOPHEN 650 MG: 325 TABLET, FILM COATED ORAL at 14:53

## 2021-06-11 RX ADMIN — SODIUM CHLORIDE: 9 INJECTION, SOLUTION INTRAVENOUS at 02:05

## 2021-06-11 RX ADMIN — IBUPROFEN 400 MG: 400 TABLET ORAL at 15:36

## 2021-06-11 RX ADMIN — SODIUM CHLORIDE: 9 INJECTION, SOLUTION INTRAVENOUS at 08:48

## 2021-06-11 RX ADMIN — OXYCODONE HYDROCHLORIDE 5 MG: 5 TABLET ORAL at 12:51

## 2021-06-11 RX ADMIN — HYDROCODONE BITARTRATE AND ACETAMINOPHEN 1 TABLET: 5; 325 TABLET ORAL at 21:23

## 2021-06-11 ASSESSMENT — ACTIVITIES OF DAILY LIVING (ADL)
ADLS_ACUITY_SCORE: 16

## 2021-06-11 NOTE — PLAN OF CARE
A&Ox4 and VSS on RA. Up independently and tolerating regular diet. IVF infusing and Tele: ST. PRN oxycodone, advil, and tylenol given for pain. Continue to monitor.

## 2021-06-11 NOTE — PROVIDER NOTIFICATION
MD Notification    Notified Person: MD    Notified Person Name: Jasmina    Notification Date/Time: 6/11/21 at 1510    Notification Interaction: Web page    Purpose of Notification: Pt did not like how po oxycodone made her feel. Can we order something else po for pain? Thanks!    Orders Received:    Comments:

## 2021-06-11 NOTE — PROGRESS NOTES
[facility]    Medicine Progress Note - Hospitalist Service        Date of Admission:  6/10/2021  8:36 AM    Assessment & Plan:   Aurora Durant is a very pleasant 35 year old female with a past medical history of depression, anxiety, and GERD who presents with several days of right flank pain, abdominal pain, dysuria, nausea and vomiting found to have acute pyelonephritis with evidence of sepsis.     Sepsis due to acute pyelonephritis  Patient presents with 5-6 days of right flank pain and intermittent dysuria, along with fevers.  A day prior to presentation she started having chills along with worsening nausea and pain, and saw her PCP where she was prescribed Cipro 500 mg twice daily.  She had taken 2 doses of this.    -She came to the ER now because her pain has significantly worsened since yesterday with fever of 102.   -CT abdomen pelvis with contrast shows hypoenhancing area in the mid right kidney concerning for pyelonephritis given the history.    -continue IV ceftriaxone   -Urine culture growing E coli; await BC  -IVF-normal saline at 100 mL/h  -pain control with Tylenol as needed, oxycodone 5-10 mg every 3 hours as needed, IV Dilaudid 0.3-0.5 mg every 2 hours as needed.  -antiemetics as needed     Asymptomatic COVID-19 testing is negative       Diet: Combination Diet Regular Diet Adult     DVT Prophylaxis: Pneumatic Compression Devices   Guerrero Catheter: not present  Code Status: Full Code     Disposition Plan    Expected discharge: 1-2 days pending clinical improvement, culture data finalized and antibiotic plan established  Entered: Walt Freedman MD 06/11/2021, 11:24 AM        The patient's care was discussed with the Bedside Nurse and Patient.    Walt Freedman MD  Hospitalist Service  [facility]    ______________________________________________________________________    Interval History   Fever up to 103.1 overnight.  Continues to have right flank pain  "although slightly better.  Nausea improving.  No vomiting.    Data reviewed today: I reviewed all medications, new labs and imaging results over the last 24 hours. I personally reviewed no images or EKG's today.    Physical Exam   Vital signs:  Temp: 99.1  F (37.3  C) Temp src: Oral BP: 92/56 Pulse: 86   Resp: 16 SpO2: 96 % O2 Device: None (Room air)   Height: 160 cm (5' 3\") Weight: 52.2 kg (115 lb)  Estimated body mass index is 20.37 kg/m  as calculated from the following:    Height as of this encounter: 1.6 m (5' 3\").    Weight as of this encounter: 52.2 kg (115 lb).      Wt Readings from Last 2 Encounters:   06/10/21 52.2 kg (115 lb)   06/09/21 52.6 kg (116 lb)       Gen: AAOX3, NAD, comfortable  HEENT: Supple neck, moist oral mucosa, no pallor  Resp: CTA B/L, normal WOB, no crackles, no wheezes  CVS: RRR, no murmur  Abd/GI/: Soft, mildly tender right flank. BS- normoactive.    Skin: Warm, dry no rashes  MSK: No joint deformities, no pedal edema  Neuro- CN- intact. No focal deficits.  Spontaneously moving all 4 extremities      Data   Recent Labs   Lab 06/11/21  0622 06/10/21  0853 06/09/21  1643   WBC 14.1* 19.8* 20.1*   HGB 9.9* 12.1 12.6   MCV 95 93 94   * 163 167    133 133   POTASSIUM 4.0 3.9 4.2   CHLORIDE 111* 103 101   CO2 21 24 25   BUN 9 11 13   CR 0.85 0.96 0.95   ANIONGAP 6 6 7   JEREMIE 7.8* 8.9 9.1   GLC 92 115* 104*   ALBUMIN  --  3.5 3.8   PROTTOTAL  --  7.6 7.8   BILITOTAL  --  0.6 0.9   ALKPHOS  --  54 50   ALT  --  17 19   AST  --  13 19       No results found for this or any previous visit (from the past 24 hour(s)).  Medications     sodium chloride 100 mL/hr at 06/11/21 1118       cefTRIAXone  2 g Intravenous Q24H     sodium chloride (PF)  3 mL Intracatheter Q8H           "

## 2021-06-11 NOTE — UTILIZATION REVIEW
Admission Status; Secondary Review Determination       Under the authority of the Utilization Management Committee, the utilization review process indicated a secondary review on the above patient. The review outcome is based on review of the medical records, discussions with staff, and applying clinical experience noted on the date of the review.     (x) Inpatient Status Appropriate - This patient's medical care is consistent with medical management for inpatient care and reasonable inpatient medical practice.     RATIONALE FOR DETERMINATION   35 year old female with a past medical history of depression, anxiety, and GERD who presents with several days of right flank pain, abdominal pain, dysuria, nausea and vomiting found to have acute pyelonephritis with evidence of sepsis.   Patient meets sepsis criteria.  Failed outpatient management  The expected length of stay at the time of admission was more than 2 nights because of the severity of illness, intensity of service provided, and risk for adverse outcome. Inpatient admission is appropriate.     This document was produced using voice recognition software       The information on this document is developed by the utilization review team in order for the business office to ensure compliance. This only denotes the appropriateness of proper admission status and does not reflect the quality of care rendered.   The definitions of Inpatient Status and Observation Status used in making the determination above are those provided in the CMS Coverage Manual, Chapter 1 and Chapter 6, section 70.4.   Sincerely,   HANNAH TY MD   System Medical Director   Utilization Management   [facility].

## 2021-06-11 NOTE — PLAN OF CARE
Inpatient status. Covid negative. Pt A&O X4. VSS on RA. Soft BP. Temp @2238 was 102.4, PRN tylenol given. Tele ST. Denies nausea. PRN Dilaudid given @1942, @0613 for pain with good relief. PRN Zofran given @0613. Regular diet. IVF NS @150 ml/hr. Up SBA. Discharge pending. Continue to monitor.

## 2021-06-12 VITALS
RESPIRATION RATE: 18 BRPM | HEART RATE: 78 BPM | TEMPERATURE: 99.4 F | SYSTOLIC BLOOD PRESSURE: 114 MMHG | BODY MASS INDEX: 20.38 KG/M2 | HEIGHT: 63 IN | OXYGEN SATURATION: 98 % | DIASTOLIC BLOOD PRESSURE: 67 MMHG | WEIGHT: 115 LBS

## 2021-06-12 PROCEDURE — 258N000003 HC RX IP 258 OP 636: Performed by: INTERNAL MEDICINE

## 2021-06-12 PROCEDURE — 99239 HOSP IP/OBS DSCHRG MGMT >30: CPT | Performed by: INTERNAL MEDICINE

## 2021-06-12 PROCEDURE — 250N000013 HC RX MED GY IP 250 OP 250 PS 637: Performed by: INTERNAL MEDICINE

## 2021-06-12 PROCEDURE — 250N000011 HC RX IP 250 OP 636: Performed by: PHYSICIAN ASSISTANT

## 2021-06-12 PROCEDURE — 250N000013 HC RX MED GY IP 250 OP 250 PS 637: Performed by: PHYSICIAN ASSISTANT

## 2021-06-12 RX ORDER — SULFAMETHOXAZOLE/TRIMETHOPRIM 800-160 MG
1 TABLET ORAL 2 TIMES DAILY
Qty: 14 TABLET | Refills: 0 | Status: SHIPPED | OUTPATIENT
Start: 2021-06-12 | End: 2021-06-19

## 2021-06-12 RX ORDER — HYDROCODONE BITARTRATE AND ACETAMINOPHEN 5; 325 MG/1; MG/1
1 TABLET ORAL EVERY 6 HOURS PRN
Qty: 12 TABLET | Refills: 0 | Status: SHIPPED | OUTPATIENT
Start: 2021-06-12 | End: 2021-07-07

## 2021-06-12 RX ORDER — ACETAMINOPHEN 325 MG/1
650 TABLET ORAL EVERY 8 HOURS PRN
COMMUNITY
Start: 2021-06-12 | End: 2021-07-07

## 2021-06-12 RX ADMIN — CEFTRIAXONE SODIUM 2 G: 2 INJECTION, POWDER, FOR SOLUTION INTRAMUSCULAR; INTRAVENOUS at 08:06

## 2021-06-12 RX ADMIN — ACETAMINOPHEN 650 MG: 325 TABLET, FILM COATED ORAL at 12:32

## 2021-06-12 RX ADMIN — IBUPROFEN 400 MG: 400 TABLET ORAL at 13:50

## 2021-06-12 RX ADMIN — SODIUM CHLORIDE: 9 INJECTION, SOLUTION INTRAVENOUS at 05:40

## 2021-06-12 ASSESSMENT — ACTIVITIES OF DAILY LIVING (ADL)
ADLS_ACUITY_SCORE: 16

## 2021-06-12 NOTE — DISCHARGE SUMMARY
[facility]    Discharge Summary  Hospitalist    Date of Admission:  6/10/2021  Date of Discharge:  6/12/2021  Discharging Provider: Walt Freedman MD    Discharge Diagnoses     Sepsis due to acute pyelonephritis    Hospital Course:  Aurora Durant is a very pleasant 35 year old female with a past medical history of depression, anxiety, and GERD who presents with several days of right flank pain, abdominal pain, dysuria, nausea and vomiting found to have acute pyelonephritis with evidence of sepsis.     Sepsis due to acute pyelonephritis  Patient presents with 5-6 days of right flank pain and intermittent dysuria, along with fevers.  A day prior to presentation she started having chills along with worsening nausea and pain, and saw her PCP where she was prescribed Cipro 500 mg twice daily.  She had taken 2 doses of this.    -She came to the ER now because her pain has significantly worsened since yesterday with fever of 102.   -CT abdomen pelvis with contrast shows hypoenhancing area in the mid right kidney concerning for pyelonephritis given the history.    -Consider ultrasound follow-up in 2 months to confirm resolution and to exclude an underlying lesion.   -Initially placed on empiric ceftriaxone, clinically much improved and patient expressed her desire to go home today.  She was afebrile.  -Urine culture growing E coli which was sensitive to both ciprofloxacin and Bactrim.  Blood culture was negative thus far.  Decision was made to place her on Bactrim for 7 more days at the time of discharge to complete a 10-day course total..     Walt Freedman MD    Significant Results and Procedures   see below    Pending Results     Unresulted Labs Ordered in the Past 30 Days of this Admission     Date and Time Order Name Status Description    6/10/2021 0902 Blood culture Preliminary     6/10/2021 0902 Blood culture Preliminary     6/9/2021 1630 URINE CULTURE AEROBIC BACTERIAL  Preliminary           Code Status   Full Code       Primary Care Physician   Ariana Garcia    Physical Exam   Temp: 97.9  F (36.6  C) Temp src: Oral BP: 105/59 Pulse: 79   Resp: 16 SpO2: 100 % O2 Device: None (Room air)      Constitutional: AAOX3, NAD  Respiratory: CTA B/L, Normal WOB  Cardiovascular: RRR, No murmur  GI: Soft, Non- tender, BS- normoactive, minimal to no CVA tenderness on the right side.  Neuro: CN- grossly intact, Motor strength 5/5 on all 4 extremities.     Discharge Disposition   Discharged to home  Condition at discharge: Stable    Consultations This Hospital Stay   None    Time Spent on this Encounter   I, Walt Freedman MD, personally saw the patient today and spent greater than 30 minutes discharging this patient.    Discharge Orders      Follow-up and recommended labs and tests    Follow up with primary care provider, Ariana Garcia, within 4-5 days for hospital follow- up.     Activity    Your activity upon discharge: activity as tolerated     Full Code     Diet    Follow this diet upon discharge: Orders Placed This Encounter      Combination Diet Regular Diet Adult       Discharge Medications   Current Discharge Medication List      START taking these medications    Details   acetaminophen (TYLENOL) 325 MG tablet Take 2 tablets (650 mg) by mouth every 8 hours as needed for mild pain    Associated Diagnoses: Acute pyelonephritis      HYDROcodone-acetaminophen (NORCO) 5-325 MG tablet Take 1 tablet by mouth every 6 hours as needed for moderate to severe pain  Qty: 12 tablet, Refills: 0    Associated Diagnoses: Acute pyelonephritis      sulfamethoxazole-trimethoprim (BACTRIM DS) 800-160 MG tablet Take 1 tablet by mouth 2 times daily for 7 days  Qty: 14 tablet, Refills: 0    Associated Diagnoses: Acute pyelonephritis         CONTINUE these medications which have NOT CHANGED    Details   Cyanocobalamin (B-12) 1000 MCG TBCR Take 1 tablet by mouth daily       ibuprofen (ADVIL/MOTRIN) 200 MG  tablet Take 200-600 mg by mouth every 4 hours as needed for mild pain      Probiotic Product (PROBIOTIC PO) Take 1 tablet by mouth daily      !! UNABLE TO FIND MEDICATION NAME: apple cider      !! UNABLE TO FIND MEDICATION NAME: collagen      vitamin D3 (CHOLECALCIFEROL) 50 mcg (2000 units) tablet Take 1 tablet by mouth daily       !! - Potential duplicate medications found. Please discuss with provider.      STOP taking these medications       ciprofloxacin (CIPRO) 500 MG tablet Comments:   Reason for Stopping:             Allergies   No Known Allergies  Data   Most Recent 3 CBC's:  Recent Labs   Lab Test 06/11/21  0622 06/10/21  0853 06/09/21  1643   WBC 14.1* 19.8* 20.1*   HGB 9.9* 12.1 12.6   MCV 95 93 94   * 163 167      Most Recent 3 BMP's:  Recent Labs   Lab Test 06/11/21  0622 06/10/21  0853 06/09/21  1643    133 133   POTASSIUM 4.0 3.9 4.2   CHLORIDE 111* 103 101   CO2 21 24 25   BUN 9 11 13   CR 0.85 0.96 0.95   ANIONGAP 6 6 7   JEREMIE 7.8* 8.9 9.1   GLC 92 115* 104*     Most Recent 2 LFT's:  Recent Labs   Lab Test 06/10/21  0853 06/09/21  1643   AST 13 19   ALT 17 19   ALKPHOS 54 50   BILITOTAL 0.6 0.9     Most Recent INR's and Anticoagulation Dosing History:  Anticoagulation Dose History     There is no flowsheet data to display.        Most Recent 3 Troponin's:No lab results found.  Most Recent Cholesterol Panel:No lab results found.  Most Recent 6 Bacteria Isolates From Any Culture (See EPIC Reports for Culture Details):  Recent Labs   Lab Test 06/10/21  0929 06/10/21  0908 06/10/21  0853 06/09/21  1643   CULT No growth after 2 days No growth after 2 days No growth >100,000 colonies/mL  Escherichia coli  Additional susceptibilities in progress  6.12  *     Most Recent TSH, T4 and A1c Labs:  Recent Labs   Lab Test 07/23/18  1609 01/30/14  1406 01/30/14  1406   TSH 0.84   < > 0.94   T4  --   --  0.93    < > = values in this interval not displayed.       Results for orders placed or performed  during the hospital encounter of 06/10/21   CT Abdomen Pelvis w Contrast    Narrative    CT ABDOMEN AND PELVIS WITH CONTRAST 6/10/2021 10:03 AM    CLINICAL HISTORY: Abdominal pain. Flank pain, kidney stone suspected.  Right flank pain with fever.  Concern for pyelonephritis vs infected  stone.    TECHNIQUE: CT scan of the abdomen and pelvis was performed following  injection of IV contrast. Multiplanar reformats were obtained. Dose  reduction techniques were used.  CONTRAST: 59 mL Isovue-[id number]    COMPARISON: None.    FINDINGS:   LOWER CHEST: No infiltrates or effusions.    HEPATOBILIARY: No significant mass or bile duct dilatation. No  calcified gallstones.     PANCREAS: No significant mass, duct dilatation, or inflammatory  change.    SPLEEN: Normal size.    ADRENAL GLANDS: No significant nodules.    KIDNEYS/BLADDER: Hypoenhancing area in the mid right kidney with  perinephric and periureteral stranding compatible with right  pyelonephritis. No definite left pyelonephritis. No definite  urolithiasis, tiny stones can be obscured by contrast.    BOWEL: No obstruction or inflammatory change.    PELVIC ORGANS: No pelvic masses. Foreign body in the vagina,  presumably a pelvic floor stabilizer.    ADDITIONAL FINDINGS: Small amount of pelvic free fluid which is  nonspecific but likely physiologic.    MUSCULOSKELETAL: Survey of the visualized bony structures demonstrates  no destructive bony lesions.      Impression    IMPRESSION: Hypoenhancing area in the mid right kidney concerning for  pyelonephritis given the history. Consider ultrasound follow-up in 2  months to confirm resolution and to exclude an underlying lesion.    SHRUTHI GODINEZ MD

## 2021-06-12 NOTE — PLAN OF CARE
Pt is A+O x 4. BP is soft, asymptomatic; other VSS on RA. C/o right flank pain after amb, received PRN Norco with relief. BS+. No nausea/emesis. Minimal chest pressure/neck swelling quickly resolved. HOB elevated, no SOB. Tele is SR. Up IND. Remains on IV Rocephin. PIV is infusing. Voiding.

## 2021-06-13 LAB
BACTERIA SPEC CULT: ABNORMAL
Lab: ABNORMAL
SPECIMEN SOURCE: ABNORMAL

## 2021-06-14 ENCOUNTER — OFFICE VISIT (OUTPATIENT)
Dept: FAMILY MEDICINE | Facility: CLINIC | Age: 36
End: 2021-06-14
Payer: COMMERCIAL

## 2021-06-14 ENCOUNTER — ANCILLARY PROCEDURE (OUTPATIENT)
Dept: GENERAL RADIOLOGY | Facility: CLINIC | Age: 36
End: 2021-06-14
Attending: NURSE PRACTITIONER
Payer: COMMERCIAL

## 2021-06-14 ENCOUNTER — TELEPHONE (OUTPATIENT)
Dept: FAMILY MEDICINE | Facility: CLINIC | Age: 36
End: 2021-06-14

## 2021-06-14 VITALS
OXYGEN SATURATION: 99 % | DIASTOLIC BLOOD PRESSURE: 69 MMHG | TEMPERATURE: 97.9 F | SYSTOLIC BLOOD PRESSURE: 109 MMHG | WEIGHT: 118 LBS | HEART RATE: 53 BPM | BODY MASS INDEX: 20.9 KG/M2

## 2021-06-14 DIAGNOSIS — J98.59 MEDIASTINAL ABNORMALITY: ICD-10-CM

## 2021-06-14 DIAGNOSIS — R07.9 CHEST PAIN, UNSPECIFIED TYPE: Primary | ICD-10-CM

## 2021-06-14 DIAGNOSIS — A41.9 SEPSIS, DUE TO UNSPECIFIED ORGANISM, UNSPECIFIED WHETHER ACUTE ORGAN DYSFUNCTION PRESENT (H): ICD-10-CM

## 2021-06-14 DIAGNOSIS — Z78.9 VEGAN DIET: ICD-10-CM

## 2021-06-14 DIAGNOSIS — Z09 HOSPITAL DISCHARGE FOLLOW-UP: ICD-10-CM

## 2021-06-14 DIAGNOSIS — R79.89 POSITIVE D DIMER: ICD-10-CM

## 2021-06-14 DIAGNOSIS — R07.9 CHEST PAIN, UNSPECIFIED TYPE: ICD-10-CM

## 2021-06-14 LAB
D DIMER PPP FEU-MCNC: 2.4 UG/ML FEU (ref 0–0.5)
ERYTHROCYTE [DISTWIDTH] IN BLOOD BY AUTOMATED COUNT: 12.4 % (ref 10–15)
HCT VFR BLD AUTO: 33.2 % (ref 35–47)
HGB BLD-MCNC: 11.3 G/DL (ref 11.7–15.7)
MCH RBC QN AUTO: 31.8 PG (ref 26.5–33)
MCHC RBC AUTO-ENTMCNC: 34 G/DL (ref 31.5–36.5)
MCV RBC AUTO: 94 FL (ref 78–100)
PLATELET # BLD AUTO: 222 10E9/L (ref 150–450)
RBC # BLD AUTO: 3.55 10E12/L (ref 3.8–5.2)
WBC # BLD AUTO: 5.3 10E9/L (ref 4–11)

## 2021-06-14 PROCEDURE — 85027 COMPLETE CBC AUTOMATED: CPT | Performed by: NURSE PRACTITIONER

## 2021-06-14 PROCEDURE — 82607 VITAMIN B-12: CPT | Performed by: NURSE PRACTITIONER

## 2021-06-14 PROCEDURE — 80048 BASIC METABOLIC PNL TOTAL CA: CPT | Performed by: NURSE PRACTITIONER

## 2021-06-14 PROCEDURE — 82728 ASSAY OF FERRITIN: CPT | Performed by: NURSE PRACTITIONER

## 2021-06-14 PROCEDURE — 71046 X-RAY EXAM CHEST 2 VIEWS: CPT | Performed by: INTERNAL MEDICINE

## 2021-06-14 PROCEDURE — 99215 OFFICE O/P EST HI 40 MIN: CPT | Performed by: NURSE PRACTITIONER

## 2021-06-14 PROCEDURE — 36415 COLL VENOUS BLD VENIPUNCTURE: CPT | Performed by: NURSE PRACTITIONER

## 2021-06-14 PROCEDURE — 85379 FIBRIN DEGRADATION QUANT: CPT | Performed by: NURSE PRACTITIONER

## 2021-06-14 PROCEDURE — 93000 ELECTROCARDIOGRAM COMPLETE: CPT | Performed by: NURSE PRACTITIONER

## 2021-06-14 RX ORDER — ONDANSETRON 4 MG/1
4 TABLET, ORALLY DISINTEGRATING ORAL ONCE
OUTPATIENT
Start: 2021-06-14 | End: 2021-06-14

## 2021-06-14 NOTE — PROGRESS NOTES
Was called by the patient today, she apparently had improvement through Saturday however since Sunday morning having headache, nausea, one episode of emesis and 2 episodes of hematuria.  She denies fever.  I did call in a prescription of Zofran for nausea and vomiting.  Advised her that she should be seen by a provider today, she has a appointment with her PCP at 3:30 PM later today at [facility].

## 2021-06-14 NOTE — PROGRESS NOTES
Assessment & Plan   Problem List Items Addressed This Visit     Sepsis, due to unspecified organism, unspecified whether acute organ dysfunction present (H)    Relevant Orders    D dimer, quantitative (Completed)    CT Chest Pulmonary Embolism w Contrast    Vegan diet    Relevant Orders    Vitamin B12 (Completed)    Ferritin (Completed)      Other Visit Diagnoses     Chest pain, unspecified type    -  Primary    Relevant Orders    XR Chest 2 Views (Completed)    EKG 12-lead complete w/read - Clinics (Completed)    CBC with platelets (Completed)    Basic metabolic panel  (Ca, Cl, CO2, Creat, Gluc, K, Na, BUN) (Completed)    D dimer, quantitative (Completed)    CT Chest Pulmonary Embolism w Contrast    Hospital discharge follow-up        Positive D dimer        Relevant Orders    CT Chest Pulmonary Embolism w Contrast         Pt was recently admitted for pyelo and sepsis. She now has new CP that is positional and worse with lying down. Neg CXR, reviewed by me and final read is negative. EKG NSR without actue findings. Will complete labs today including d-dimer as she is at higher risk for clot d/t recent sepsis. Will compplete CT prn. If CT is neg it is likely a compilation of gerd, anxiety, infection/inflammation. If symptoms persist she may need echo for further eval.     60 minutes spent on the date of the encounter doing chart review, history and exam, documentation and further activities as noted including lab follow up, further ordering and notifying pt.      Addendum:  Positive d-dimer. CT PE study ordered. D-dimer could be positive d/t her recent sepsis, but considering chest symptoms and higher risk with sepsis, imaging is warranted.     Addendeum:  CT positive for mediastinal fullness. Will refer to oncology for further evaluation for potential thymoma. I spoke with pt about this.       Yehuda Santos, HERBERT CNP  [facility] NICHOLE Simon is a 35 year old who presents for  "the following health issues     HPI       Hospital Follow-up Visit:    Hospital/Nursing Home/IP Rehab Facility: [facility]  Date of Admission: 06/10/2021  Date of Discharge: 06/12/2021  Reason(s) for Admission: Sepsis due to acute pyelonephritis      Was your hospitalization related to COVID-19? No   Problems taking medications regularly:  None  Medication changes since discharge: None  Problems adhering to non-medication therapy:  None    Patient still having Sx and new symptoms  Hematuria, persistent HA, nausea and vomiting, no appetite, chest pain (when laying down it's worse) shortness of breath  Summary of hospitalization:  [facility] discharge summary reviewed  Diagnostic Tests/Treatments reviewed.  Follow up needed: none  Other Healthcare Providers Involved in Patient s Care:         None  Update since discharge: fluctuating course. Post Discharge Medication Reconciliation: discharge medications reconciled, continue medications without change.  Plan of care communicated with patient and family          Pt was just admitted for pyelo and sepsis.   Chest now hurts and it radiates up right neck. Started midday Friday when in hospital. Throat feels swollen.  Feel SOB and worse CP when laying down  Feels anxious at baseline, but has never felt CP or SOB with her anxiety  When lays down feels like lungs are \"being squished\"  Today's anxiety is worse  No appetite   N/V yesterday, had taken Norco   Hematuria this am x3  Just finished period Saturday  No burning  Has some frequency, urgency  (happening since Saturday morning)  Drinking more water  No fever  Had slight chills  Coughing in hospital, but coughing more since discharge on Saturday  Seasonal allergies  No swelling in legs, no calf pain  Never had a blood clot  Not on birth control        Review of Systems   Detailed as above       Objective    /69 (BP Location: Right arm, Cuff Size: Adult Regular)   Pulse 53   Temp 97.9  F " (36.6  C) (Tympanic)   Wt 53.5 kg (118 lb)   LMP 06/10/2021   SpO2 99%   BMI 20.90 kg/m    Body mass index is 20.9 kg/m .  Physical Exam  Constitutional:       Appearance: Normal appearance.   HENT:      Head: Normocephalic.   Cardiovascular:      Rate and Rhythm: Normal rate and regular rhythm.      Heart sounds: Normal heart sounds. No murmur.   Pulmonary:      Effort: Pulmonary effort is normal.      Breath sounds: Normal breath sounds.   Musculoskeletal:      Right lower leg: No edema.      Left lower leg: No edema.   Skin:     General: Skin is warm and dry.   Neurological:      Mental Status: She is alert.   Psychiatric:         Mood and Affect: Mood normal.

## 2021-06-15 ENCOUNTER — HOSPITAL ENCOUNTER (OUTPATIENT)
Dept: CT IMAGING | Facility: CLINIC | Age: 36
Discharge: HOME | End: 2021-06-15
Attending: NURSE PRACTITIONER | Admitting: NURSE PRACTITIONER
Payer: COMMERCIAL

## 2021-06-15 ENCOUNTER — MYC MEDICAL ADVICE (OUTPATIENT)
Dept: FAMILY MEDICINE | Facility: CLINIC | Age: 36
End: 2021-06-15

## 2021-06-15 DIAGNOSIS — A41.9 SEPSIS, DUE TO UNSPECIFIED ORGANISM, UNSPECIFIED WHETHER ACUTE ORGAN DYSFUNCTION PRESENT (H): ICD-10-CM

## 2021-06-15 DIAGNOSIS — R07.9 CHEST PAIN, UNSPECIFIED TYPE: ICD-10-CM

## 2021-06-15 DIAGNOSIS — R79.89 POSITIVE D DIMER: ICD-10-CM

## 2021-06-15 LAB
ANION GAP SERPL CALCULATED.3IONS-SCNC: 5 MMOL/L (ref 3–14)
BUN SERPL-MCNC: 8 MG/DL (ref 7–30)
CALCIUM SERPL-MCNC: 9 MG/DL (ref 8.5–10.1)
CHLORIDE SERPL-SCNC: 105 MMOL/L (ref 94–109)
CO2 SERPL-SCNC: 28 MMOL/L (ref 20–32)
CREAT SERPL-MCNC: 1.03 MG/DL (ref 0.52–1.04)
FERRITIN SERPL-MCNC: 63 NG/ML (ref 12–150)
GFR SERPL CREATININE-BSD FRML MDRD: 71 ML/MIN/{1.73_M2}
GLUCOSE SERPL-MCNC: 93 MG/DL (ref 70–99)
POTASSIUM SERPL-SCNC: 3.8 MMOL/L (ref 3.4–5.3)
SODIUM SERPL-SCNC: 138 MMOL/L (ref 133–144)
VIT B12 SERPL-MCNC: 1033 PG/ML (ref 193–986)

## 2021-06-15 PROCEDURE — 250N000011 HC RX IP 250 OP 636: Performed by: NURSE PRACTITIONER

## 2021-06-15 PROCEDURE — 71275 CT ANGIOGRAPHY CHEST: CPT

## 2021-06-15 PROCEDURE — 250N000009 HC RX 250: Performed by: NURSE PRACTITIONER

## 2021-06-15 RX ORDER — IOPAMIDOL 755 MG/ML
56 INJECTION, SOLUTION INTRAVASCULAR ONCE
Status: COMPLETED | OUTPATIENT
Start: 2021-06-15 | End: 2021-06-15

## 2021-06-15 RX ADMIN — SODIUM CHLORIDE 83 ML: 9 INJECTION, SOLUTION INTRAVENOUS at 10:05

## 2021-06-15 RX ADMIN — IOPAMIDOL 56 ML: 755 INJECTION, SOLUTION INTRAVENOUS at 10:05

## 2021-06-15 NOTE — RESULT ENCOUNTER NOTE
As expected the d-dimer is positive. I placed the order for the CT scan. Please call [facility] to schedule your test: [phone]. You probably have to wait until tomorrow. Your hemoglobin has come up nicely. We will see what the last labs show.    Yehuda

## 2021-06-16 LAB
BACTERIA SPEC CULT: NO GROWTH
BACTERIA SPEC CULT: NO GROWTH
SPECIMEN SOURCE: NORMAL
SPECIMEN SOURCE: NORMAL

## 2021-06-16 NOTE — RESULT ENCOUNTER NOTE
Sorry I missed you when I called. Lots to go over here and please let me know if you have any questions.   Good news is there is no PE, or blood clot in the lungs.   Next news is that there is potentially something going on. You probably already googled thymoma. It can be a benign or cancerous growth. I would like for you to see the oncologist to help us decipher what is going on exactly. Hopefully it is just this extra tissue that you have that is sitting in the chest and won't cause any issues. You should get a call from them but here is their number just in case. This would be to see a medical oncologist. They have a great team so they will take great care of you. Reach out with any questions   Yehuda

## 2021-06-18 ENCOUNTER — MYC MEDICAL ADVICE (OUTPATIENT)
Dept: FAMILY MEDICINE | Facility: CLINIC | Age: 36
End: 2021-06-18

## 2021-06-23 NOTE — TELEPHONE ENCOUNTER
RECORDS STATUS - ALL OTHER DIAGNOSIS      RECORDS RECEIVED FROM: [facility]   DATE RECEIVED: 6/23   NOTES STATUS DETAILS   OFFICE NOTE from referring provider Epic Yehuda Santos APRN CNP in  [facility]ILY [facility]   OFFICE NOTE from medical oncologist     DISCHARGE SUMMARY from hospital [facility] 6/10/21   DISCHARGE REPORT from the ER     OPERATIVE REPORT NA    MEDICATION LIST [facility] 6/12/21   CLINICAL TRIAL TREATMENTS TO DATE     LABS     PATHOLOGY REPORTS     ANYTHING RELATED TO DIAGNOSIS Epic 6/14/21   GENONOMIC TESTING     TYPE:     IMAGING (NEED IMAGES & REPORT)     CT SCANS PACS 6/15/21, 6/10/21: [facility]   MRI     MAMMO     ULTRASOUND     PET

## 2021-07-07 ENCOUNTER — VIRTUAL VISIT (OUTPATIENT)
Dept: PULMONOLOGY | Facility: CLINIC | Age: 36
End: 2021-07-07
Attending: NURSE PRACTITIONER
Payer: COMMERCIAL

## 2021-07-07 ENCOUNTER — OFFICE VISIT (OUTPATIENT)
Dept: FAMILY MEDICINE | Facility: CLINIC | Age: 36
End: 2021-07-07
Payer: COMMERCIAL

## 2021-07-07 ENCOUNTER — PRE VISIT (OUTPATIENT)
Dept: PULMONOLOGY | Facility: CLINIC | Age: 36
End: 2021-07-07

## 2021-07-07 VITALS
BODY MASS INDEX: 20.2 KG/M2 | WEIGHT: 114 LBS | OXYGEN SATURATION: 99 % | HEART RATE: 62 BPM | TEMPERATURE: 98.2 F | HEIGHT: 63 IN | SYSTOLIC BLOOD PRESSURE: 102 MMHG | DIASTOLIC BLOOD PRESSURE: 66 MMHG

## 2021-07-07 DIAGNOSIS — J98.59 MEDIASTINAL ABNORMALITY: ICD-10-CM

## 2021-07-07 DIAGNOSIS — R30.0 DYSURIA: Primary | ICD-10-CM

## 2021-07-07 DIAGNOSIS — Z87.448 HISTORY OF PYELONEPHRITIS: ICD-10-CM

## 2021-07-07 DIAGNOSIS — R93.5 ABNORMAL CT OF THE ABDOMEN: ICD-10-CM

## 2021-07-07 LAB
ALBUMIN UR-MCNC: NEGATIVE MG/DL
APPEARANCE UR: CLEAR
BILIRUB UR QL STRIP: NEGATIVE
COLOR UR AUTO: YELLOW
GLUCOSE UR STRIP-MCNC: NEGATIVE MG/DL
HGB UR QL STRIP: ABNORMAL
KETONES UR STRIP-MCNC: NEGATIVE MG/DL
LEUKOCYTE ESTERASE UR QL STRIP: NEGATIVE
NITRATE UR QL: NEGATIVE
NON-SQ EPI CELLS #/AREA URNS LPF: NORMAL /LPF
PH UR STRIP: 7 PH (ref 5–7)
RBC #/AREA URNS AUTO: NORMAL /HPF
SOURCE: ABNORMAL
SP GR UR STRIP: 1.01 (ref 1–1.03)
UROBILINOGEN UR STRIP-ACNC: 0.2 EU/DL (ref 0.2–1)
WBC #/AREA URNS AUTO: NORMAL /HPF

## 2021-07-07 PROCEDURE — 99213 OFFICE O/P EST LOW 20 MIN: CPT | Performed by: PHYSICIAN ASSISTANT

## 2021-07-07 PROCEDURE — 99204 OFFICE O/P NEW MOD 45 MIN: CPT | Mod: 95 | Performed by: INTERNAL MEDICINE

## 2021-07-07 PROCEDURE — 81001 URINALYSIS AUTO W/SCOPE: CPT | Performed by: PHYSICIAN ASSISTANT

## 2021-07-07 ASSESSMENT — MIFFLIN-ST. JEOR: SCORE: 1186.23

## 2021-07-07 NOTE — PROGRESS NOTES
"Aurora is a 35 year old who is being evaluated via a billable video visit.      How would you like to obtain your AVS? MyChart  If the video visit is dropped, the invitation should be resent by: Send to e-mail at: [email or web address]  Will anyone else be joining your video visit? No    Vitals - Patient Reported  Weight (Patient Reported): 53.5 kg (118 lb)  Height (Patient Reported): 160 cm (5' 3\")  BMI (Based on Pt Reported Ht/Wt): 20.9  Pain Score: No Pain (0)      Video-Visit Details    Type of service:  Video Visit    Video Time: 14 minutes    Originating Location (pt. Location): Home    Distant Location (provider location):  [facility]     Platform used for Video Visit: Ana Younger [facility]  Interventional Pulmonary Clinic Virtual Visit Note    July 7, 2021    Chief complaint:  Aurora Durant is a 35 year old female seen for   Chief Complaint   Patient presents with     Video Visit     NEW; MEDIASTINAL ABDNORMALITY       Reason for clinic visit / Chief complaint:   Pleural effusion, anterior mediastinal abnormality    Assessment and Plan:  Pleural effusions, bilateral small on the right side, trace on the left side.  Likely reactive secondary to her right pyelonephritis that required hospitalization recently.  She has no respiratory symptoms.  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  We will repeat another CT chest in 6 months for the last time to determine further steps of care.  This was discussed in our multidisciplinary meeting with thoracic surgeons whom indicated that CT images are not concerning.  Right pyelonephritis requiring hospitalization.  No obvious cause was attributed to UTI and bladder infection treated with antibiotics.      Billing: Based on Medical Decision Making (Complexity):  L4    History of Present Illness:  35 years old woman with no known medical problems not on any medication recently hospitalized for " right-sided pyelonephritis for 2 days at [facility].  There was no underlying cause for her pyelonephritis but it was attributed to extension of UTI/bladder infection.  She is back to normal.  She has no history of respiratory problems in the past no history of exposure to any chemicals, radiation or asbestos.  She is a lifetime non-smoker.  She has no respiratory symptoms.  No family history of pulmonary problems.       No Known Allergies     Past Medical History:   Diagnosis Date     Anxiety      ASCUS with positive high risk HPV 12-3-10     GERD (gastroesophageal reflux disease)      History of depression      Iritis 2012        Past Surgical History:   Procedure Laterality Date     COLPOSCOPY CERVIX, LOOP ELECTRODE BIOPSY, COMBINED  3-25-11    Lakhwinder     NO HISTORY OF SURGERY          Social History     Socioeconomic History     Marital status: Single     Spouse name: Not on file     Number of children: Not on file     Years of education: Not on file     Highest education level: Not on file   Occupational History     Not on file   Social Needs     Financial resource strain: Not on file     Food insecurity     Worry: Not on file     Inability: Not on file     Transportation needs     Medical: Not on file     Non-medical: Not on file   Tobacco Use     Smoking status: Never Smoker     Smokeless tobacco: Never Used   Substance and Sexual Activity     Alcohol use: Yes     Comment: 1-2 drinks per week     Drug use: Yes     Types: Marijuana     Sexual activity: Yes     Partners: Male   Lifestyle     Physical activity     Days per week: Not on file     Minutes per session: Not on file     Stress: Not on file   Relationships     Social connections     Talks on phone: Not on file     Gets together: Not on file     Attends Sikh service: Not on file     Active member of club or organization: Not on file     Attends meetings of clubs or organizations: Not on file     Relationship status: Not on file      Intimate partner violence     Fear of current or ex partner: Not on file     Emotionally abused: Not on file     Physically abused: Not on file     Forced sexual activity: Not on file   Other Topics Concern     Parent/sibling w/ CABG, MI or angioplasty before 65F 55M? 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        Family History   Problem Relation Age of Onset     Depression Maternal Grandmother      Depression Maternal Uncle      Hypertension Paternal Grandmother      Skin Cancer Other         PGF     Colon Cancer Maternal Uncle 65        Immunization History   Administered Date(s) Administered     COVID-19,PF,Moderna 04/02/2021, 04/30/2021     HEPA 10/19/2015     Hep B, Peds or Adolescent 10/19/2015     HepA-Adult 10/19/2015     HepB 10/19/2015     Influenza (IIV3) PF 12/03/2010     Influenza Vaccine IM > 6 months Valent IIV4 01/30/2014, 10/19/2015     MMR 04/23/1999     Td (Adult), Adsorbed 04/23/1999     Tdap (Adacel,Boostrix) 01/01/2011     Typhoid Oral 10/19/2015     Varicella 10/19/2015       Current Outpatient Medications   Medication Sig     Cyanocobalamin (B-12) 1000 MCG TBCR Take 1 tablet by mouth daily      Probiotic Product (PROBIOTIC PO) Take 1 tablet by mouth daily     UNABLE TO FIND MEDICATION NAME: apple cider     UNABLE TO FIND MEDICATION NAME: collagen     vitamin D3 (CHOLECALCIFEROL) 50 mcg (2000 units) tablet Take 1 tablet by mouth daily     acetaminophen (TYLENOL) 325 MG tablet Take 2 tablets (650 mg) by mouth every 8 hours as needed for mild pain     HYDROcodone-acetaminophen (NORCO) 5-325 MG tablet Take 1 tablet by mouth every 6 hours as needed for moderate to severe pain     ibuprofen (ADVIL/MOTRIN) 200 MG tablet Take 200-600 mg by mouth every 4 hours as needed for mild pain     No current facility-administered medications for this visit.         Review of Systems:  I have done 10 points of review systems and  all negative except for those mentioned in HPI    Physical examination  Constitutional: Oriented, not in distress  Head and neck: normal posture and movements  Respiratory: Normal tidal breathing, no shortness of breath, no audible wheezing or stridor over the phone or video visit  Neurological: Alert, orientedx3, no motor deficits  Psychiatric:  Mood and affect are appropriate with insight into his/her medical condition    Data:  Lab Results   Component Value Date    WBC 5.3 06/14/2021     Lab Results   Component Value Date    RBC 3.55 06/14/2021     Lab Results   Component Value Date    HGB 11.3 06/14/2021     Lab Results   Component Value Date    HCT 33.2 06/14/2021     Lab Results   Component Value Date    MCV 94 06/14/2021     Lab Results   Component Value Date    MCH 31.8 06/14/2021     Lab Results   Component Value Date    MCHC 34.0 06/14/2021     Lab Results   Component Value Date    RDW 12.4 06/14/2021     Lab Results   Component Value Date     06/14/2021       Lab Results   Component Value Date     06/14/2021      Lab Results   Component Value Date    POTASSIUM 3.8 06/14/2021     Lab Results   Component Value Date    CHLORIDE 105 06/14/2021     Lab Results   Component Value Date    JEREMIE 9.0 06/14/2021     Lab Results   Component Value Date    CO2 28 06/14/2021     Lab Results   Component Value Date    BUN 8 06/14/2021     Lab Results   Component Value Date    CR 1.03 06/14/2021     Lab Results   Component Value Date    GLC 93 06/14/2021         ANATOLIY Grubbs MD

## 2021-07-07 NOTE — PROGRESS NOTES
HPI: Aurora is a 34 yo with a hx of recent hospitalization for pyelonephritis with sepsis here with changes in urination off and on for weeks.  She states that lately she has felt like she can't empty her bladder normally  She has to shift around on the toilet to empty her bladder.  Once she is done and back up and walking around, feels the urge to urinate more.  She had these symptoms off and on for the past year including prior to her hospitalization.  She was told during that hospitalization to f/u with urol and contacted the office earlier today to request a referral. Was advised to come in for UA first.  Denies dysuria or gross hematuria.  occas feels like she feels soreness in the R flank; seems positional and not severe like when she had pyelonephritis  She tries to urinate after intercourse  Denies vaginal discharge or odor    CT abd in hosp on 6/10/21:           IMPRESSION: Hypoenhancing area in the mid right kidney concerning for  pyelonephritis given the history. Consider ultrasound follow-up in 2  months to confirm resolution and to exclude an underlying lesion.       Past Medical History:   Diagnosis Date     Anxiety      ASCUS with positive high risk HPV 12-3-10     GERD (gastroesophageal reflux disease)      History of depression      Iritis 2012     Past Surgical History:   Procedure Laterality Date     COLPOSCOPY CERVIX, LOOP ELECTRODE BIOPSY, COMBINED  3-25-11    [place]     NO HISTORY OF SURGERY       Social History     Tobacco Use     Smoking status: Never Smoker     Smokeless tobacco: Never Used   Substance Use Topics     Alcohol use: Yes     Comment: 1-2 drinks per week     Current Outpatient Medications   Medication Sig Dispense Refill     Cyanocobalamin (B-12) 1000 MCG TBCR Take 1 tablet by mouth daily        UNABLE TO FIND MEDICATION NAME: apple cider       UNABLE TO FIND MEDICATION NAME: collagen       vitamin D3 (CHOLECALCIFEROL) 50 mcg (2000 units) tablet Take 1 tablet by mouth daily    "    No Known Allergies  FAMILY HISTORY NOTED AND REVIEWED        PHYSICAL EXAM:    /66 (BP Location: Left arm, Patient Position: Chair, Cuff Size: Adult Regular)   Pulse 62   Temp 98.2  F (36.8  C) (Oral)   Ht 1.6 m (5' 3\")   Wt 51.7 kg (114 lb)   LMP 06/10/2021   SpO2 99%   BMI 20.19 kg/m      Patient appears non toxic    Results for orders placed or performed in visit on 07/07/21   *UA reflex to Microscopic and Culture ([facility] (except [place] and [place])     Status: Abnormal    Specimen: Midstream Urine   Result Value Ref Range    Color Urine Yellow     Appearance Urine Clear     Glucose Urine Negative NEG^Negative mg/dL    Bilirubin Urine Negative NEG^Negative    Ketones Urine Negative NEG^Negative mg/dL    Specific Gravity Urine 1.010 1.003 - 1.035    Blood Urine Small (A) NEG^Negative    pH Urine 7.0 5.0 - 7.0 pH    Protein Albumin Urine Negative NEG^Negative mg/dL    Urobilinogen Urine 0.2 0.2 - 1.0 EU/dL    Nitrite Urine Negative NEG^Negative    Leukocyte Esterase Urine Negative NEG^Negative    Source Midstream Urine    Urine Microscopic     Status: None   Result Value Ref Range    WBC Urine 0 - 5 OTO5^0 - 5 /HPF    RBC Urine O - 2 OTO2^O - 2 /HPF    Squamous Epithelial /LPF Urine Few FEW^Few /LPF       Assessment and Plan:     (R30.0) Dysuria  (primary encounter diagnosis)  Comment: UA neg, recd she f/u with urol  Plan: *UA reflex to Microscopic and Culture ([place]         and [facility] (except [place] and         [place]), Urine Microscopic            (Z87.448) History of pyelonephritis  Comment:   Plan: US Kidney Right, Adult Urology Referral            (R93.5) Abnormal CT of the abdomen  Comment:   Plan: US Kidney Right, Adult Urology Referral              Ariana Garcia PA-C      "

## 2021-07-07 NOTE — LETTER
"7/7/2021       RE: Aurora Durant  [address]  [facility] [facility] [address]     Dear Colleague,    Thank you for referring your patient, Aurora Durant, to the [facility] at [facility]. Please see a copy of my visit note below.    Aurora is a 35 year old who is being evaluated via a billable video visit.      How would you like to obtain your AVS? MyChart  If the video visit is dropped, the invitation should be resent by: Send to e-mail at: [email or web address]  Will anyone else be joining your video visit? No    Vitals - Patient Reported  Weight (Patient Reported): 53.5 kg (118 lb)  Height (Patient Reported): 160 cm (5' 3\")  BMI (Based on Pt Reported Ht/Wt): 20.9  Pain Score: No Pain (0)      Video-Visit Details    Type of service:  Video Visit    Video Time: 14 minutes    Originating Location (pt. Location): Home    Distant Location (provider location):  [facility]     Platform used for Video Visit: Ana Younger MA      [facility]  Interventional Pulmonary Clinic Virtual Visit Note    July 7, 2021    Chief complaint:  Aurora Durant is a 35 year old female seen for   Chief Complaint   Patient presents with     Video Visit     NEW; MEDIASTINAL ABDNORMALITY       Reason for clinic visit / Chief complaint:   Pleural effusion, anterior mediastinal abnormality    Assessment and Plan:  Pleural effusions, bilateral small on the right side, trace on the left side.  Likely reactive secondary to her right pyelonephritis that required hospitalization recently.  She has no respiratory symptoms.  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  We will repeat another CT chest in 6 months for the last time to determine further steps of care.  This was discussed in our multidisciplinary meeting with thoracic surgeons whom indicated that CT images are not concerning.  Right " pyelonephritis requiring hospitalization.  No obvious cause was attributed to UTI and bladder infection treated with antibiotics.      Billing: Based on Medical Decision Making (Complexity):  L4    History of Present Illness:  35 years old woman with no known medical problems not on any medication recently hospitalized for right-sided pyelonephritis for 2 days at [facility].  There was no underlying cause for her pyelonephritis but it was attributed to extension of UTI/bladder infection.  She is back to normal.  She has no history of respiratory problems in the past no history of exposure to any chemicals, radiation or asbestos.  She is a lifetime non-smoker.  She has no respiratory symptoms.  No family history of pulmonary problems.       No Known Allergies     Past Medical History:   Diagnosis Date     Anxiety      ASCUS with positive high risk HPV 12-3-10     GERD (gastroesophageal reflux disease)      History of depression      Iritis 2012        Past Surgical History:   Procedure Laterality Date     COLPOSCOPY CERVIX, LOOP ELECTRODE BIOPSY, COMBINED  3-25-11    [place]     NO HISTORY OF SURGERY          Social History     Socioeconomic History     Marital status: Single     Spouse name: Not on file     Number of children: Not on file     Years of education: Not on file     Highest education level: Not on file   Occupational History     Not on file   Social Needs     Financial resource strain: Not on file     Food insecurity     Worry: Not on file     Inability: Not on file     Transportation needs     Medical: Not on file     Non-medical: Not on file   Tobacco Use     Smoking status: Never Smoker     Smokeless tobacco: Never Used   Substance and Sexual Activity     Alcohol use: Yes     Comment: 1-2 drinks per week     Drug use: Yes     Types: Marijuana     Sexual activity: Yes     Partners: Male   Lifestyle     Physical activity     Days per week: Not on file     Minutes per session: Not on file      Stress: Not on file   Relationships     Social connections     Talks on phone: Not on file     Gets together: Not on file     Attends [employer] service: Not on file     Active member of club or organization: Not on file     Attends meetings of clubs or organizations: Not on file     Relationship status: Not on file     Intimate partner violence     Fear of current or ex partner: Not on file     Emotionally abused: Not on file     Physically abused: Not on file     Forced sexual activity: Not on file   Other Topics Concern     Parent/sibling w/ CABG, MI or angioplasty before 65F 55M? 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        Family History   Problem Relation Age of Onset     Depression Maternal Grandmother      Depression Maternal Uncle      Hypertension Paternal Grandmother      Skin Cancer Other         PGF     Colon Cancer Maternal Uncle 65        Immunization History   Administered Date(s) Administered     COVID-19,PF,Moderna 04/02/2021, 04/30/2021     HEPA 10/19/2015     Hep B, Peds or Adolescent 10/19/2015     HepA-Adult 10/19/2015     HepB 10/19/2015     Influenza (IIV3) PF 12/03/2010     Influenza Vaccine IM > 6 months Valent IIV4 01/30/2014, 10/19/2015     MMR 04/23/1999     Td (Adult), Adsorbed 04/23/1999     Tdap (Adacel,Boostrix) 01/01/2011     Typhoid Oral 10/19/2015     Varicella 10/19/2015       Current Outpatient Medications   Medication Sig     Cyanocobalamin (B-12) 1000 MCG TBCR Take 1 tablet by mouth daily      Probiotic Product (PROBIOTIC PO) Take 1 tablet by mouth daily     UNABLE TO FIND MEDICATION NAME: apple cider     UNABLE TO FIND MEDICATION NAME: collagen     vitamin D3 (CHOLECALCIFEROL) 50 mcg (2000 units) tablet Take 1 tablet by mouth daily     acetaminophen (TYLENOL) 325 MG tablet Take 2 tablets (650 mg) by mouth every 8 hours as needed for mild pain     HYDROcodone-acetaminophen (NORCO) 5-325 MG tablet  Take 1 tablet by mouth every 6 hours as needed for moderate to severe pain     ibuprofen (ADVIL/MOTRIN) 200 MG tablet Take 200-600 mg by mouth every 4 hours as needed for mild pain     No current facility-administered medications for this visit.         Review of Systems:  I have done 10 points of review systems and all negative except for those mentioned in HPI    Physical examination  Constitutional: Oriented, not in distress  Head and neck: normal posture and movements  Respiratory: Normal tidal breathing, no shortness of breath, no audible wheezing or stridor over the phone or video visit  Neurological: Alert, orientedx3, no motor deficits  Psychiatric:  Mood and affect are appropriate with insight into his/her medical condition    Data:  Lab Results   Component Value Date    WBC 5.3 06/14/2021     Lab Results   Component Value Date    RBC 3.55 06/14/2021     Lab Results   Component Value Date    HGB 11.3 06/14/2021     Lab Results   Component Value Date    HCT 33.2 06/14/2021     Lab Results   Component Value Date    MCV 94 06/14/2021     Lab Results   Component Value Date    MCH 31.8 06/14/2021     Lab Results   Component Value Date    MCHC 34.0 06/14/2021     Lab Results   Component Value Date    RDW 12.4 06/14/2021     Lab Results   Component Value Date     06/14/2021       Lab Results   Component Value Date     06/14/2021      Lab Results   Component Value Date    POTASSIUM 3.8 06/14/2021     Lab Results   Component Value Date    CHLORIDE 105 06/14/2021     Lab Results   Component Value Date    JEREMIE 9.0 06/14/2021     Lab Results   Component Value Date    CO2 28 06/14/2021     Lab Results   Component Value Date    BUN 8 06/14/2021     Lab Results   Component Value Date    CR 1.03 06/14/2021     Lab Results   Component Value Date    GLC 93 06/14/2021         ANATOLIY Grubbs MD

## 2021-07-19 ENCOUNTER — HOSPITAL ENCOUNTER (OUTPATIENT)
Dept: ULTRASOUND IMAGING | Facility: CLINIC | Age: 36
Discharge: HOME | End: 2021-07-19
Attending: PHYSICIAN ASSISTANT | Admitting: PHYSICIAN ASSISTANT
Payer: COMMERCIAL

## 2021-07-19 DIAGNOSIS — Z87.448 HISTORY OF PYELONEPHRITIS: ICD-10-CM

## 2021-07-19 DIAGNOSIS — R93.5 ABNORMAL CT OF THE ABDOMEN: ICD-10-CM

## 2021-07-19 PROCEDURE — 76775 US EXAM ABDO BACK WALL LIM: CPT

## 2021-08-03 ENCOUNTER — OFFICE VISIT (OUTPATIENT)
Dept: UROLOGY | Facility: CLINIC | Age: 36
End: 2021-08-03
Attending: PHYSICIAN ASSISTANT
Payer: COMMERCIAL

## 2021-08-03 DIAGNOSIS — R93.5 ABNORMAL CT OF THE ABDOMEN: ICD-10-CM

## 2021-08-03 DIAGNOSIS — R30.0 DYSURIA: Primary | ICD-10-CM

## 2021-08-03 DIAGNOSIS — Z87.448 HISTORY OF PYELONEPHRITIS: ICD-10-CM

## 2021-08-03 LAB
ALBUMIN UR-MCNC: NEGATIVE MG/DL
APPEARANCE UR: CLEAR
BILIRUB UR QL STRIP: NEGATIVE
COLOR UR AUTO: YELLOW
GLUCOSE UR STRIP-MCNC: NEGATIVE MG/DL
HGB UR QL STRIP: ABNORMAL
KETONES UR STRIP-MCNC: NEGATIVE MG/DL
LEUKOCYTE ESTERASE UR QL STRIP: NEGATIVE
NITRATE UR QL: NEGATIVE
PH UR STRIP: 7 [PH] (ref 5–7)
RBC #/AREA URNS AUTO: ABNORMAL /HPF
SP GR UR STRIP: 1.01 (ref 1–1.03)
SQUAMOUS #/AREA URNS AUTO: ABNORMAL /LPF
UROBILINOGEN UR STRIP-ACNC: 0.2 E.U./DL
WBC #/AREA URNS AUTO: ABNORMAL /HPF

## 2021-08-03 PROCEDURE — 81001 URINALYSIS AUTO W/SCOPE: CPT | Performed by: PHYSICIAN ASSISTANT

## 2021-08-03 PROCEDURE — 99203 OFFICE O/P NEW LOW 30 MIN: CPT | Performed by: PHYSICIAN ASSISTANT

## 2021-08-03 NOTE — PROGRESS NOTES
CC: pyelonephritis    HPI:  Aurora Durant is a pleasant 35 year old female who presents in consultation from Ariana Garcia PA-C for evaluation of the above. Had vague symptoms of UTI leading up to her eventual visit to the ER for pain and fever. Noted to have Hypoenhancing area in the mid right kidney concerning for pyelonephritis on CT. Renal US has noted this to be resolved. Drinking more water.      Past Medical History:   Diagnosis Date     Anxiety      ASCUS with positive high risk HPV 12-3-10     GERD (gastroesophageal reflux disease)      History of depression      Iritis 2012       Past Surgical History:   Procedure Laterality Date     COLPOSCOPY CERVIX, LOOP ELECTRODE BIOPSY, COMBINED  3-25-11    [place]     NO HISTORY OF SURGERY         Social History     Socioeconomic History     Marital status: Single     Spouse name: Not on file     Number of children: Not on file     Years of education: Not on file     Highest education level: Not on file   Occupational History     Not on file   Tobacco Use     Smoking status: Never Smoker     Smokeless tobacco: Never Used   Substance and Sexual Activity     Alcohol use: Yes     Comment: 1-2 drinks per week     Drug use: Yes     Types: Marijuana     Sexual activity: Yes     Partners: Male   Other Topics Concern     Parent/sibling w/ CABG, MI or angioplasty before 65F 55M? 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     Social Determinants of Health     Financial Resource Strain:      Difficulty of Paying Living Expenses:    Food Insecurity:      Worried About Running Out of Food in the Last Year:      Ran Out of Food in the Last Year:    Transportation Needs:      Lack of Transportation (Medical):      Lack of Transportation (Non-Medical):    Physical Activity:      Days of Exercise per Week:      Minutes of Exercise per Session:    Stress:      Feeling of Stress :    Social Connections:       Frequency of Communication with Friends and Family:      Frequency of Social Gatherings with Friends and Family:      Attends Nondenominational Services:      Active Member of Clubs or Organizations:      Attends Club or Organization Meetings:      Marital Status:    Intimate Partner Violence:      Fear of Current or Ex-Partner:      Emotionally Abused:      Physically Abused:      Sexually Abused:        Family History   Problem Relation Age of Onset     Depression Maternal Grandmother      Depression Maternal Uncle      Hypertension Paternal Grandmother      Skin Cancer Other         PGF     Colon Cancer Maternal Uncle 65       ROS:14 point ROS neg other than the symptoms noted above in the HPI.    No Known Allergies    Current Outpatient Medications   Medication     Cyanocobalamin (B-12) 1000 MCG TBCR     UNABLE TO FIND     UNABLE TO FIND     vitamin D3 (CHOLECALCIFEROL) 50 mcg (2000 units) tablet     No current facility-administered medications for this visit.         PEx:     PSYCH: NAD  EYES: EOMI  NEURO: AAO x3    Urine: mod blood  PVR: 147 cc  Renal US norm    A/P: Aurora Durant is a 35 year old female with an episode of pyelonephritis, recovered  -UA. UC if symptoms.   -Discussed Hiprex/Vit C or post-coital abx if this becomes a recurrent problem.  -Could consider pelvic floor PT if frequency and sense of incomplete emptying continue. Work on relaxing pelvic floor with each void.   Shala Stafford PA-C  [facility]    27 minutes spent on the date of the encounter doing chart review, review of outside records, review of test results, interpretation of tests, patient visit and documentation   CT ABDOMEN AND PELVIS WITH CONTRAST 6/10/2021 10:03 AM     CLINICAL HISTORY: Abdominal pain. Flank pain, kidney stone suspected.  Right flank pain with fever.  Concern for pyelonephritis vs infected  stone.     TECHNIQUE: CT scan of the abdomen and pelvis was performed following  injection of IV contrast. Multiplanar  reformats were obtained. Dose  reduction techniques were used.  CONTRAST: 59 mL Isovue-370     COMPARISON: None.     FINDINGS:   LOWER CHEST: No infiltrates or effusions.     HEPATOBILIARY: No significant mass or bile duct dilatation. No  calcified gallstones.      PANCREAS: No significant mass, duct dilatation, or inflammatory  change.     SPLEEN: Normal size.     ADRENAL GLANDS: No significant nodules.     KIDNEYS/BLADDER: Hypoenhancing area in the mid right kidney with  perinephric and periureteral stranding compatible with right  pyelonephritis. No definite left pyelonephritis. No definite  urolithiasis, tiny stones can be obscured by contrast.     BOWEL: No obstruction or inflammatory change.     PELVIC ORGANS: No pelvic masses. Foreign body in the vagina,  presumably a pelvic floor stabilizer.     ADDITIONAL FINDINGS: Small amount of pelvic free fluid which is  nonspecific but likely physiologic.     MUSCULOSKELETAL: Survey of the visualized bony structures demonstrates  no destructive bony lesions.                                                                      IMPRESSION: Hypoenhancing area in the mid right kidney concerning for  pyelonephritis given the history. Consider ultrasound follow-up in 2  months to confirm resolution and to exclude an underlying lesion.

## 2021-09-04 ENCOUNTER — HOSPITAL ENCOUNTER (OUTPATIENT)
Age: 36
Setting detail: SPECIMEN
Discharge: HOME | End: 2021-09-04
Payer: COMMERCIAL

## 2021-09-05 LAB
DIRECT EXAM: ABNORMAL
Lab: ABNORMAL
SPECIMEN DESCRIPTION: ABNORMAL

## 2021-09-29 ENCOUNTER — MYC MEDICAL ADVICE (OUTPATIENT)
Dept: FAMILY MEDICINE | Facility: CLINIC | Age: 36
End: 2021-09-29

## 2021-10-03 ENCOUNTER — HEALTH MAINTENANCE LETTER (OUTPATIENT)
Age: 36
End: 2021-10-03

## 2021-11-03 ENCOUNTER — E-VISIT (OUTPATIENT)
Dept: URGENT CARE | Facility: CLINIC | Age: 36
End: 2021-11-03
Payer: COMMERCIAL

## 2021-11-03 ENCOUNTER — LAB (OUTPATIENT)
Dept: LAB | Facility: CLINIC | Age: 36
End: 2021-11-03
Attending: NURSE PRACTITIONER

## 2021-11-03 DIAGNOSIS — Z20.822 CLOSE EXPOSURE TO 2019 NOVEL CORONAVIRUS: Primary | ICD-10-CM

## 2021-11-03 DIAGNOSIS — Z20.822 CLOSE EXPOSURE TO 2019 NOVEL CORONAVIRUS: ICD-10-CM

## 2021-11-03 LAB — SARS-COV-2 RNA RESP QL NAA+PROBE: NEGATIVE

## 2021-11-03 PROCEDURE — U0003 INFECTIOUS AGENT DETECTION BY NUCLEIC ACID (DNA OR RNA); SEVERE ACUTE RESPIRATORY SYNDROME CORONAVIRUS 2 (SARS-COV-2) (CORONAVIRUS DISEASE [COVID-19]), AMPLIFIED PROBE TECHNIQUE, MAKING USE OF HIGH THROUGHPUT TECHNOLOGIES AS DESCRIBED BY CMS-2020-01-R: HCPCS

## 2021-11-03 PROCEDURE — U0005 INFEC AGEN DETEC AMPLI PROBE: HCPCS

## 2021-11-03 PROCEDURE — 99421 OL DIG E/M SVC 5-10 MIN: CPT | Performed by: NURSE PRACTITIONER

## 2021-11-03 NOTE — PATIENT INSTRUCTIONS
"  Dear Aurora Durant,    Based on your exposure to COVID-19 (coronavirus), we would like to test you for this virus. I have placed an order for this test.The best time for testing is 5-7 days after the exposure.    How to schedule:  Go to your [employer] home page and scroll down to the section that says  You have an appointment that needs to be scheduled  and click the large green button that says  Schedule Now  and follow the steps to find the next available opening.     If you are unable to complete these [employer] scheduling steps, please call [phone] to schedule your testing.     Return to work/school/ guidance:   For people with high risk exposures outside the home    Please let your workplace manager and staffing office know when your quarantine ends.     We can not give you an exact date as it depends on the information below. You can calculate this on your own or work with your manager/staffing office to calculate this. (For example if you were exposed on 10/4, you would have to quarantine for 14 full days. That would be through 10/18. You could return on 10/19.)    Quarantine Guidelines:  Patients (\"contacts\") who have been in close prolonged contact of an infected person(s) (within six feet for at least 15 minutes within a 24 hour period), and remain asymptomatic should enter quarantine based on the following options:    14-day quarantine period (this remains the CDC recommendation for the greatest protection against spread of COVID-19) OR    Minimum 7-day quarantine with negative RT-PCR test collected on day 5 or later OR    10-day quarantine with no test  Quarantine Guideline exceptions are as follows:    People who have been fully vaccinated do not need to quarantine if the exposure was at least 2 weeks after the last vaccination. This includes vaccinated health care workers.    Not fully vaccinated and unvaccinated Individuals who work in health care, congregate care, or congregate living " should be off work for 14 days from their last date of exposure. Community activities for this group can be resumed based on options above. Fully vaccinated individuals in this group do not need to quarantine from work after exposure.    Not fully vaccinated and unvaccinated people whose high-risk exposure was a household member should always quarantine for 14 days from their last date of exposure. Fully vaccinated people in this category do not need to quarantine.    Not fully vaccinated or unvaccinated residents of congregate care and congregate living settings should always quarantine for 14 days from their last date of exposure. Fully vaccinated residents do not need to quarantine.  Note: If you have ongoing exposure to the covid positive person, this quarantine period may be more than 14 days. (For example, if you are continued to be exposed to your child who tested positive and cannot isolate from them, then the quarantine of 7-14 days can't start until your child is no longer contagious. This is typically 10 days from onset of the child's symptoms. So the total duration may be 17-24 days in this case.)    You should continue symptom monitoring until day 14 post-exposure. If you develop signs or symptoms of COVID-19, isolate and get tested (even if you have been tested already).    How to quarantine:   Stay home and away from others. Don't go to school or anywhere else. Generally quarantine means staying home from work but there are some exceptions to this. Please contact your workplace.  No hugging, kissing or shaking hands.  Don't let anyone visit.  Cover your mouth and nose with a mask, tissue or washcloth to avoid spreading germs.  Wash your hands and face often. Use soap and water.    What are the symptoms of COVID-19?  The most common symptoms are cough, fever and trouble breathing. Less common symptoms include headache, body aches, fatigue (feeling very tired), chills, sore throat, stuffy or runny nose,  diarrhea (loose poop), loss of taste or smell, belly pain, and nausea or vomiting (feeling sick to your stomach or throwing up).  After 14 days, if you have still don't have symptoms, you likely don't have this virus.  If you develop symptoms, follow these guidelines.  If you're normally healthy: Please start another eVisit.  If you have a serious health problem (like cancer, heart failure, an organ transplant or kidney disease): Call your specialty clinic. Let them know that you might have COVID-19.    Where can I get more information?  [facility] - About COVID-19: [email or web address]  CDC - What to Do If You're Sick: www.cdc.gov/coronavirus/2019-ncov/about/steps-when-sick.html  CDC - Ending Home Isolation: www.cdc.gov/coronavirus/2019-ncov/hcp/disposition-in-home-patients.html  CDC - Caring for Someone: www.cdc.gov/coronavirus/2019-ncov/if-you-are-sick/care-for-someone.html  [facility] clinical trials (COVID-19 research studies): [email or web address]-clinical-trials  Below are the COVID-19 hotlines at the [place] Department [facility] ([facility]). Interpreters are available.  For health questions: Call 606-853-6687 or 1-105.327.6533 (7 a.m. to 7 p.m.)  For questions about schools and childcare: Call 746-779-3799 or 1-327.174.7297 (7 a.m. to 7 p.m.)

## 2022-03-09 ENCOUNTER — HOSPITAL ENCOUNTER (OUTPATIENT)
Age: 37
Setting detail: SPECIMEN
Discharge: HOME | End: 2022-03-09

## 2022-03-10 LAB
CANDIDA SPECIES, DNA PROBE: NEGATIVE
GARDNERELLA VAGINALIS, DNA PROBE: POSITIVE
SOURCE: ABNORMAL
TRICHOMONAS VAGINALIS DNA: NEGATIVE

## 2022-05-15 ENCOUNTER — HEALTH MAINTENANCE LETTER (OUTPATIENT)
Age: 37
End: 2022-05-15

## 2022-08-02 ENCOUNTER — HOSPITAL ENCOUNTER (OUTPATIENT)
Age: 37
Setting detail: SPECIMEN
Discharge: HOME | End: 2022-08-02

## 2022-08-03 LAB
C. TRACHOMATIS DNA ,URINE: NEGATIVE
N. GONORRHOEAE DNA, URINE: NEGATIVE
SOURCE: NORMAL
SPECIMEN DESCRIPTION: NORMAL
TRICHOMONAS VAGINALI, MOLECULAR: NEGATIVE

## 2022-09-10 ENCOUNTER — HEALTH MAINTENANCE LETTER (OUTPATIENT)
Age: 37
End: 2022-09-10

## 2022-12-04 ENCOUNTER — HOSPITAL ENCOUNTER (EMERGENCY)
Age: 37
Discharge: HOME | End: 2022-12-04
Attending: EMERGENCY MEDICINE
Payer: COMMERCIAL

## 2022-12-04 VITALS
DIASTOLIC BLOOD PRESSURE: 89 MMHG | HEART RATE: 89 BPM | SYSTOLIC BLOOD PRESSURE: 115 MMHG | RESPIRATION RATE: 18 BRPM | TEMPERATURE: 98.2 F | OXYGEN SATURATION: 95 %

## 2022-12-04 DIAGNOSIS — R76.11 ABNORMAL REACTION TO TUBERCULIN TEST: Primary | ICD-10-CM

## 2022-12-04 PROCEDURE — 99282 EMERGENCY DEPT VISIT SF MDM: CPT

## 2022-12-04 ASSESSMENT — ENCOUNTER SYMPTOMS
DIARRHEA: 0
BLOOD IN STOOL: 0
TROUBLE SWALLOWING: 0
COUGH: 0
ABDOMINAL PAIN: 0
NAUSEA: 0
SHORTNESS OF BREATH: 0
VOMITING: 0
SORE THROAT: 0
BACK PAIN: 0

## 2022-12-04 ASSESSMENT — PAIN - FUNCTIONAL ASSESSMENT: PAIN_FUNCTIONAL_ASSESSMENT: NONE - DENIES PAIN

## 2022-12-04 NOTE — ED PROVIDER NOTES
[facility] ENCOUNTER          Pt Name: Toni Eisenberg  MRN: [id number]  [place] 1985  Date of evaluation: 12/4/2022  Treating Resident Physician: Lea Gaona MD  Supervising Brielle Valadez MD        CHIEF COMPLAINT       Chief Complaint   Patient presents with    Other     TB test site red. History obtained from the patient. HISTORY OF PRESENT ILLNESS    HPI  Toni Eisenberg is a 40 y.o. female with PMHx of HIV, opioid abuse in remission following Suboxone clinic past surgical history of cosmetic surgery who presents to the emergency department for evaluation of an abnormal tuberculin skin test.  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 States that for the job she had a TST performed at an outpatient clinic on Monday. She did not have this followed up with 72 hours. She presents emergency department today with induration and erythema around the injection site measuring approximately 1.5 to 1.7 cm in diameter. She denies fever, cough, night sweats, weight loss, myalgias and states she is otherwise asymptomatic. States that her HIV viral loads have been nondetectable for several years. States that she follows up with infectious disease doc in [place]. The patient has no other acute complaints at this time. REVIEW OF SYSTEMS   Review of Systems   Constitutional:  Negative for chills, diaphoresis, fatigue and fever. HENT:  Negative for sore throat and trouble swallowing. Eyes:  Negative for visual disturbance. Respiratory:  Negative for cough and shortness of breath. Cardiovascular:  Negative for chest pain, palpitations and leg swelling. Gastrointestinal:  Negative for abdominal pain, blood in stool, diarrhea, nausea and vomiting. Genitourinary:  Negative for dysuria, hematuria and urgency.    Musculoskeletal:  Negative for arthralgias, back pain, myalgias and neck pain. Skin:  Negative for rash. Neurological:  Negative for seizures, syncope, weakness, numbness and headaches. PAST MEDICAL AND SURGICAL HISTORY     Past Medical History:   Diagnosis Date    Abnormal Pap smear 2/2014    had colposcopy done    Pap smear 09/23/2010    negative for intraepithelial lesion/malignancy    Pap smear abnormality     History of abnormal pap    Smoker      History reviewed. No pertinent surgical history. MEDICATIONS   No current facility-administered medications for this encounter. No current outpatient medications on file. SOCIAL HISTORY     Social History     Social History Narrative    Not on file     Social History     Tobacco Use    Smoking status: Every Day     Packs/day: 0.50     Years: 10.00     Pack years: 5.00     Types: Cigarettes    Smokeless tobacco: Never   Vaping Use    Vaping Use: Never used   Substance Use Topics    Alcohol use: Yes     Alcohol/week: 2.0 standard drinks     Types: 2 Shots of liquor per week     Comment: Seldom    Drug use: No     Comment: last about September 2014         ALLERGIES   No Known Allergies      FAMILY HISTORY     Family History   Problem Relation Age of Onset    Cancer Mother         cervix    Heart Disease Father          PREVIOUS RECORDS   Previous records reviewed: I reviewed the patient's past medical records including relevant labs, imaging and procedures. PHYSICAL EXAM     ED Triage Vitals   BP Temp Temp Source Heart Rate Resp SpO2 Height Weight   12/04/22 1026 12/04/22 1024 12/04/22 1024 12/04/22 1024 12/04/22 1024 12/04/22 1024 -- --   115/89 98.2 °F (36.8 °C) Oral 89 18 95 %       Initial vital signs and nursing assessment reviewed and normal. There is no height or weight on file to calculate BMI. Pulsoximetry is normal per my interpretation.     Additional Vital Signs:  Vitals:    12/04/22 1026   BP: 115/89   Pulse:    Resp:    Temp:    SpO2:        Physical Exam  Vitals and nursing note reviewed. Constitutional:       Appearance: Normal appearance. HENT:      Head: Normocephalic and atraumatic. Right Ear: Tympanic membrane normal.      Left Ear: Tympanic membrane normal.      Nose: Nose normal.      Mouth/Throat:      Mouth: Mucous membranes are moist.      Pharynx: Oropharynx is clear. No oropharyngeal exudate. Eyes:      General: No scleral icterus. Extraocular Movements: Extraocular movements intact. Conjunctiva/sclera: Conjunctivae normal.      Pupils: Pupils are equal, round, and reactive to light. Cardiovascular:      Rate and Rhythm: Normal rate and regular rhythm. Pulses: Normal pulses. Heart sounds: Normal heart sounds. No murmur heard. No friction rub. No gallop. Pulmonary:      Effort: Pulmonary effort is normal. No respiratory distress. Breath sounds: Normal breath sounds. Abdominal:      Palpations: Abdomen is soft. Tenderness: There is no abdominal tenderness. There is no right CVA tenderness, left CVA tenderness, guarding or rebound. Musculoskeletal:         General: No swelling or tenderness. Normal range of motion. Cervical back: Normal range of motion and neck supple. Right lower leg: No edema. Left lower leg: No edema. Skin:     General: Skin is warm and dry. Capillary Refill: Capillary refill takes less than 2 seconds. Comments: There is a 1.5-1.7cm area of erythema at the site of the TST   Neurological:      General: No focal deficit present. Mental Status: She is alert and oriented to person, place, and time. Cranial Nerves: No cranial nerve deficit. Motor: No weakness.            ED RESULTS   Laboratory results:  Labs Reviewed - No data to display    Radiologic studies results:  No orders to display       ED Medications administered this visit: Medications - No data to display      ED COURSE     ED Course as of 12/04/22 1146   Sun Dec 04, 2022   1052 Attempted to consult [facility] infectious disease. No doctor. [TM]   [facility] with [facility], Dr. Adamaris Hyde. He states the patient has infectious disease follow-up in 2 months. States that the results are essentially uninterpretable after 72-hour window. Reports that patient will likely need a repeat skin test outpatient or perhaps a quantiferon. Will inform patient's primary ID doctor, Dr. Tatiana Moraes on Monday [TM]      ED Course User Index  [TM] Richie Rucker MD           MEDICAL DECISION MAKING   Given the patient's above chief complaint and findings on history and physical examination, I thought it was appropriate to consider the following emergency medical conditions:  Positive TBT, cellulitis  Although some of these diagnoses are unlikely they were considered in my medical decision making. Patient presents with erythema and induration around the TVT site measuring approxione 0.7 cm. I spoke with the patient's infectious disease team at [facility] who state that the patient likely will need an outpatient QuantiFERON test or possibly just a repeat of the tuberculin skin test for her employer. Recommended that she inform her employer to get the test redone. [facility] infectious disease team states that they will inform the patient's primary infectious disease doctor and contact her Monday. States that none of this is emergent and can be done over the next few months. Strict return precautions and follow up instructions were discussed with the patient prior to discharge, with which the patient agrees. MEDICATION CHANGES     New Prescriptions    No medications on file         FINAL DISPOSITION     Final diagnoses:   Abnormal reaction to tuberculin test     Condition: condition: stable  Dispo: Discharge to home      This transcription was electronically signed.  Parts of this transcriptions may have been dictated by use of voice recognition software and electronically transcribed, and parts may have been transcribed with the assistance of an ED scribe. The transcription may contain errors not detected in proofreading. Please refer to my supervising physician's documentation if my documentation differs.     Electronically Signed: Lola Merino MD, 12/04/22, 11:46 AM         Miah Kirk MD  Resident  12/04/22 [facility]. Moshe Kraft MD  Resident  12/04/22 [facility] 67. Moshe Kraft MD  Resident  12/04/22 [address] Moshe Kraft MD  Resident  12/04/22 1700

## 2022-12-04 NOTE — ED NOTES
Discharge instructions discussed and explained with Pt. Pt. Verbalized understanding and has no further questions or needs at this time.         Nathalia Pino RN  12/04/22 [id number]

## 2022-12-05 ENCOUNTER — OFFICE VISIT (OUTPATIENT)
Dept: MIDWIFE SERVICES | Facility: CLINIC | Age: 37
End: 2022-12-05
Payer: COMMERCIAL

## 2022-12-05 VITALS
WEIGHT: 124.4 LBS | BODY MASS INDEX: 21.24 KG/M2 | DIASTOLIC BLOOD PRESSURE: 70 MMHG | SYSTOLIC BLOOD PRESSURE: 112 MMHG | HEIGHT: 64 IN

## 2022-12-05 DIAGNOSIS — Z12.4 SCREENING FOR CERVICAL CANCER: Primary | ICD-10-CM

## 2022-12-05 PROCEDURE — 87624 HPV HI-RISK TYP POOLED RSLT: CPT | Performed by: ADVANCED PRACTICE MIDWIFE

## 2022-12-05 PROCEDURE — 99203 OFFICE O/P NEW LOW 30 MIN: CPT | Performed by: ADVANCED PRACTICE MIDWIFE

## 2022-12-05 PROCEDURE — G0145 SCR C/V CYTO,THINLAYER,RESCR: HCPCS | Performed by: ADVANCED PRACTICE MIDWIFE

## 2022-12-05 RX ORDER — MULTIPLE VITAMINS W/ MINERALS TAB 9MG-400MCG
TAB ORAL
COMMUNITY

## 2022-12-05 NOTE — PROGRESS NOTES
"  SUBJECTIVE:                                                   Aurora Durant is a 37 year old who presents to clinic today for the following health issue(s):  Patient presents with:  Women's Health: Last pap on 2017: NIL/HPV negative. Wants to discuss previous abnormal pap and HPV in . No other concerns reported      HPI:  Aurora presents today with questions about cervical cancer screening. She also reports a \"lump\" in the back of her vagina that she can feel when she places her menstrual cup.    Pap hx:  : LSIL.HPV and mild dysplsia (CIN1)  : ASCUS and colposcopy, normal biopsy  : normal pap  2017: NIL/HPV-    Patient's last menstrual period was 2022 (exact date).  Menstrual History: frequency: every 28 days  Patient is sexually active  .  Using natural family planning for contraception.   STI infx testing offered:  Declined    Last PHQ-9 score on record =   PHQ-9 SCORE 2017   PHQ-9 Total Score -   PHQ-9 Total Score 6     Last GAD7 score on record = No flowsheet data found.    Problem list and histories reviewed & adjusted, as indicated.  Additional history: as documented.    Patient Active Problem List   Diagnosis     Anxiety     Vegan diet     Acute pyelonephritis     Right flank pain     Non-intractable vomiting with nausea, unspecified vomiting type     Sepsis, due to unspecified organism, unspecified whether acute organ dysfunction present (H)     Past Surgical History:   Procedure Laterality Date     COLPOSCOPY CERVIX, LOOP ELECTRODE BIOPSY, COMBINED  3-25-11    Lakhwinder     NO HISTORY OF SURGERY        Social History     Tobacco Use     Smoking status: Never     Smokeless tobacco: Never   Substance Use Topics     Alcohol use: Yes     Comment: 1-2 drinks per week      Problem (# of Occurrences) Relation (Name,Age of Onset)    Depression (2) Maternal Grandmother, Maternal Uncle    Hypertension (1) Paternal Grandmother    Colon Cancer (1) Maternal Uncle (65)    Skin Cancer " "(1) Other: PGF            Current Outpatient Medications   Medication Sig     Cyanocobalamin (B-12) 1000 MCG TBCR Take 1 tablet by mouth daily      multivitamin w/minerals (THERA-VIT-M) tablet      vitamin D3 (CHOLECALCIFEROL) 50 mcg (2000 units) tablet Take 1 tablet by mouth daily     No current facility-administered medications for this visit.     No Known Allergies        OBJECTIVE:     /70 (BP Location: Right arm, Cuff Size: Adult Regular)   Ht 1.613 m (5' 3.5\")   Wt 56.4 kg (124 lb 6.4 oz)   LMP 11/25/2022 (Exact Date)   Breastfeeding No   BMI 21.69 kg/m    Body mass index is 21.69 kg/m .    PHYSICAL EXAM:  Constitutional:  Appearance: Well nourished, well developed alert, in no acute distress  Skin: General Inspection:  No rashes present, no lesions present, no areas of discoloration.  Neurologic:  Mental Status:  Oriented X3.  Normal strength and tone, sensory exam grossly normal, mentation intact and speech normal.    Psychiatric:  Mentation appears normal and affect normal/bright.  Pelvic Exam:  External Genitalia:     Normal appearance for age, no discharge present, no tenderness present, no inflammatory lesions present, color normal  Vagina:     Normal vaginal vault without central or paravaginal defects, no discharge present, no inflammatory lesions present, no masses present  Bladder:     Nontender to palpation  Urethra:   Urethral Body:  Urethra palpation normal, urethra structural support normal   Urethral Meatus:  No erythema or lesions present  Cervix:     Appearance healthy, no lesions present, nontender to palpation, no bleeding present  Perineum:     Perineum within normal limits, no evidence of trauma, no rashes or skin lesions present  Anus:     Anus within normal limits, no hemorrhoids present  Inguinal Lymph Nodes:     No lymphadenopathy present  Pubic Hair:     Normal pubic hair distribution for age  Genitalia and Groin:     No rashes present, no lesions present, no areas of " discoloration, no masses present       In-Clinic Test Results:  No results found for this or any previous visit (from the past 24 hour(s)).    ASSESSMENT/PLAN:                                                        ICD-10-CM    1. Screening for cervical cancer  Z12.4 Pap thin layer screen with HPV - recommended age 30 - 65 years          PLAN:    Discussed cervical cancer screening recommendations and HR HPV and its association with cervical cancer. Reviewed HPV transmission. Provided with written information via [employer]. Discussed she is likely feeling her cervix when placing her menstrual cup as exam is normal today.     Follow up at end of the month for physical with PCP    HERBERT Parker, EMILIANO    Total time spent was 15 minutes; this includes pre-work time, intra-service time, and post-work time including time spent on documentation which occurred on the date of service.

## 2022-12-05 NOTE — NURSING NOTE
"Chief Complaint   Patient presents with     Women's Health     Last pap on 2017: NIL/HPV negative. Wants to discuss previous abnormal pap and HPV in . No other concerns reported       Initial /70 (BP Location: Right arm, Cuff Size: Adult Regular)   Ht 1.613 m (5' 3.5\")   Wt 56.4 kg (124 lb 6.4 oz)   LMP 2022 (Exact Date)   Breastfeeding No   BMI 21.69 kg/m   Estimated body mass index is 21.69 kg/m  as calculated from the following:    Height as of this encounter: 1.613 m (5' 3.5\").    Weight as of this encounter: 56.4 kg (124 lb 6.4 oz).  BP completed using cuff size: regular    Questioned patient about current smoking habits.  Pt. has never smoked.          The following HM Due: pap smear  "

## 2022-12-07 LAB
BKR LAB AP GYN ADEQUACY: NORMAL
BKR LAB AP GYN INTERPRETATION: NORMAL
BKR LAB AP HPV REFLEX: NORMAL
BKR LAB AP PREVIOUS ABNORMAL: NORMAL
PATH REPORT.COMMENTS IMP SPEC: NORMAL
PATH REPORT.COMMENTS IMP SPEC: NORMAL
PATH REPORT.RELEVANT HX SPEC: NORMAL

## 2022-12-09 LAB
HUMAN PAPILLOMA VIRUS 16 DNA: NEGATIVE
HUMAN PAPILLOMA VIRUS 18 DNA: NEGATIVE
HUMAN PAPILLOMA VIRUS FINAL DIAGNOSIS: NORMAL
HUMAN PAPILLOMA VIRUS OTHER HR: NEGATIVE

## 2023-02-28 ENCOUNTER — ANCILLARY PROCEDURE (OUTPATIENT)
Dept: GENERAL RADIOLOGY | Facility: CLINIC | Age: 38
End: 2023-02-28
Attending: PODIATRIST
Payer: COMMERCIAL

## 2023-02-28 ENCOUNTER — OFFICE VISIT (OUTPATIENT)
Dept: PODIATRY | Facility: CLINIC | Age: 38
End: 2023-02-28
Payer: COMMERCIAL

## 2023-02-28 VITALS
BODY MASS INDEX: 21.97 KG/M2 | WEIGHT: 124 LBS | HEIGHT: 63 IN | DIASTOLIC BLOOD PRESSURE: 72 MMHG | SYSTOLIC BLOOD PRESSURE: 112 MMHG

## 2023-02-28 DIAGNOSIS — M79.672 CHRONIC FOOT PAIN, LEFT: Primary | ICD-10-CM

## 2023-02-28 DIAGNOSIS — G89.29 CHRONIC FOOT PAIN, LEFT: Primary | ICD-10-CM

## 2023-02-28 DIAGNOSIS — G57.92 NEURITIS OF LEFT FOOT: ICD-10-CM

## 2023-02-28 PROCEDURE — 99203 OFFICE O/P NEW LOW 30 MIN: CPT | Performed by: PODIATRIST

## 2023-02-28 PROCEDURE — 73630 X-RAY EXAM OF FOOT: CPT | Mod: TC | Performed by: RADIOLOGY

## 2023-02-28 NOTE — Clinical Note
2023         RE: Aurora Durant  [address] [facility] [address]        Dear Colleague,    Thank you for referring your patient, Aurora Durant, to the [facility]. Please see a copy of my visit note below.    ASSESSMENT:  Encounter Diagnoses   Name Primary?     Chronic foot pain, left Yes     Neuritis of left foot      MEDICAL DECISION MAKING:  I personally reviewed the x-ray images with Aurora.  Pes planus is appreciated.  No acute findings.    She has strong pedal pulses.  Skin appears well perfused.  Although she has night pain and hangs her foot down, this is not present as a arterial insufficiency.    Her description of pain suggests a neuritis or nerve irritation at some level.  If stemming from the foot and ankle level, this involves the medial dorsal cutaneous nerve and/or the deep peroneal nerve.    I recommend avoidance of any compression or tightness at the level of the ankle.    We discussed ongoing cold, heat, Tylenol and ibuprofen, relative rest    In addition to neuritis, stress reaction of bone is considered.    She is leaving town in the near future.  I encouraged her to follow-up when she returns.  If pain persist, will consider an MRI.  Referral to neurology is also considered.    No preceding injury complex regional pain syndrome might present like this.    Disclaimer: This note consists of symbols derived from keyboarding, dictation and/or voice recognition software. As a result, there may be errors in the script that have gone undetected. Please consider this when interpreting information found in this chart.    Jude Baxter, SADIA, FACFAS, [facility] Department of Podiatry/Foot & Ankle Surgery      ____________________________________________________________________    HPI:         Chief Complaint: Sharp, shooting, throbbing pain on top of left foot and my first 2 toes  Onset of problem: Intermittent over the past 1 to 2 months  Pain Ratin out  "of 10  She outlines the dorsal medial aspect of her left foot radiating towards the great toe.  No diagnosed low back issues.  She works a seated job  Frequency: Initially intermittent but now more constant  The pain is exacerbated by elevating the foot   Previous treatment: Ice, heat, ibuprofen, Tylenol, avoiding elevating the foot  She reports numbness in her feet    Past Medical History:   Diagnosis Date     Anxiety      ASCUS with positive high risk HPV 12-3-10     GERD (gastroesophageal reflux disease)      History of depression      Iritis 2012   *  *  Past Surgical History:   Procedure Laterality Date     COLPOSCOPY CERVIX, LOOP ELECTRODE BIOPSY, COMBINED  3-25-11    Lakhwinder     NO HISTORY OF SURGERY     *  *  Current Outpatient Medications   Medication Sig Dispense Refill     Cyanocobalamin (B-12) 1000 MCG TBCR Take 1 tablet by mouth daily        multivitamin w/minerals (THERA-VIT-M) tablet        vitamin D3 (CHOLECALCIFEROL) 50 mcg (2000 units) tablet Take 1 tablet by mouth daily           EXAM:    Vitals: /72   Ht 1.6 m (5' 3\")   Wt 56.2 kg (124 lb)   BMI 21.97 kg/m    BMI: Body mass index is 21.97 kg/m .    Constitutional:  Aurora Durant is in no apparent distress, appears well-nourished.  Cooperative with history and physical exam.    Vascular:  Pedal pulses are palpable for both the DP and PT arteries.  CFT < 3 sec.  No edema.      Neuro: Light touch sensation is intact to the L4, L5, S1 distributions  No evidence of weakness, spasticity, or contracture in the lower extremities.   No symptoms reproduced on palpation or percussion over the left tibial nerve.  She has some reproduction of symptoms with percussion over the anterior ankle.    Derm: Normal texture and turgor.  No erythema, ecchymosis, or cyanosis.  No open lesions.     Musculoskeletal:    Lower extremity muscle strength is normal.  Significant pes planus with weightbearing.  No pain on palpation over the central metatarsals or with " manipulation of the midfoot joints.    X-Ray Findings:  I personally reviewed the left foot images.  Please see comments above            Again, thank you for allowing me to participate in the care of your patient.        Sincerely,        Jude Baxter DPM

## 2023-02-28 NOTE — PROGRESS NOTES
ASSESSMENT:  Encounter Diagnoses   Name Primary?     Chronic foot pain, left Yes     Neuritis of left foot      MEDICAL DECISION MAKING:  I personally reviewed the x-ray images with Aurora.  Pes planus is appreciated.  No acute findings.    She has strong pedal pulses.  Skin appears well perfused.  Although she has night pain and hangs her foot down, this is not present as a arterial insufficiency.    Her description of pain suggests a neuritis or nerve irritation at some level.  If stemming from the foot and ankle level, this involves the medial dorsal cutaneous nerve and/or the deep peroneal nerve.    I recommend avoidance of any compression or tightness at the level of the ankle.    We discussed ongoing cold, heat, Tylenol and ibuprofen, relative rest    In addition to neuritis, stress reaction of bone is considered.    She is leaving town in the near future.  I encouraged her to follow-up when she returns.  If pain persist, will consider an MRI.  Referral to neurology is also considered.    No preceding injury complex regional pain syndrome might present like this.    Disclaimer: This note consists of symbols derived from keyboarding, dictation and/or voice recognition software. As a result, there may be errors in the script that have gone undetected. Please consider this when interpreting information found in this chart.    Jude Baxter DPM, FACFAS, MS    [place] Department of Podiatry/Foot & Ankle Surgery      ____________________________________________________________________    HPI:         Chief Complaint: Sharp, shooting, throbbing pain on top of left foot and my first 2 toes  Onset of problem: Intermittent over the past 1 to 2 months  Pain Ratin out of 10  She outlines the dorsal medial aspect of her left foot radiating towards the great toe.  No diagnosed low back issues.  She works a seated job  Frequency: Initially intermittent but now more constant  The pain is exacerbated by elevating the foot    "Previous treatment: Ice, heat, ibuprofen, Tylenol, avoiding elevating the foot  She reports numbness in her feet    Past Medical History:   Diagnosis Date     Anxiety      ASCUS with positive high risk HPV 12-3-10     GERD (gastroesophageal reflux disease)      History of depression      Iritis 2012   *  *  Past Surgical History:   Procedure Laterality Date     COLPOSCOPY CERVIX, LOOP ELECTRODE BIOPSY, COMBINED  3-25-11    Lakhwinder     NO HISTORY OF SURGERY     *  *  Current Outpatient Medications   Medication Sig Dispense Refill     Cyanocobalamin (B-12) 1000 MCG TBCR Take 1 tablet by mouth daily        multivitamin w/minerals (THERA-VIT-M) tablet        vitamin D3 (CHOLECALCIFEROL) 50 mcg (2000 units) tablet Take 1 tablet by mouth daily           EXAM:    Vitals: /72   Ht 1.6 m (5' 3\")   Wt 56.2 kg (124 lb)   BMI 21.97 kg/m    BMI: Body mass index is 21.97 kg/m .    Constitutional:  Aurora Durant is in no apparent distress, appears well-nourished.  Cooperative with history and physical exam.    Vascular:  Pedal pulses are palpable for both the DP and PT arteries.  CFT < 3 sec.  No edema.      Neuro: Light touch sensation is intact to the L4, L5, S1 distributions  No evidence of weakness, spasticity, or contracture in the lower extremities.   No symptoms reproduced on palpation or percussion over the left tibial nerve.  She has some reproduction of symptoms with percussion over the anterior ankle.    Derm: Normal texture and turgor.  No erythema, ecchymosis, or cyanosis.  No open lesions.     Musculoskeletal:    Lower extremity muscle strength is normal.  Significant pes planus with weightbearing.  No pain on palpation over the central metatarsals or with manipulation of the midfoot joints.    X-Ray Findings:  I personally reviewed the left foot images.  Please see comments above        "

## 2023-02-28 NOTE — PATIENT INSTRUCTIONS
Thank you for choosing [facility]!    DR. DANG'S CLINIC LOCATIONS:     [facility] TRIAGE LINE: [phone]   [address] [place] [address] APPOINTMENTS: [phone]   [place] [address] RADIOLOGY: [phone]   (Every other Tues - Wed - Fri PM) SET UP SURGERY: [phone]    PHYSICAL THERAPY: [phone]   [facility] SPECIALTY BILLING QUESTIONS: [phone] [address] FAX: [phone]   [place] [address]    (Thurs & Fri AM)         Recommendation:   Heat   Ice   Ibruprophen

## 2023-06-03 ENCOUNTER — HEALTH MAINTENANCE LETTER (OUTPATIENT)
Age: 38
End: 2023-06-03

## 2023-07-04 ASSESSMENT — ENCOUNTER SYMPTOMS
EYE PAIN: 0
DYSURIA: 0
DIARRHEA: 0
COUGH: 0
FEVER: 0
CONSTIPATION: 0
HEMATOCHEZIA: 0
BREAST MASS: 0
CHILLS: 0
ABDOMINAL PAIN: 0
ARTHRALGIAS: 0
DIZZINESS: 0
FREQUENCY: 1
PALPITATIONS: 0
HEARTBURN: 0
HEADACHES: 0
WEAKNESS: 0
JOINT SWELLING: 0
MYALGIAS: 0
SORE THROAT: 0
HEMATURIA: 0
NAUSEA: 0
PARESTHESIAS: 0
SHORTNESS OF BREATH: 0
NERVOUS/ANXIOUS: 0

## 2023-07-05 ENCOUNTER — OFFICE VISIT (OUTPATIENT)
Dept: FAMILY MEDICINE | Facility: CLINIC | Age: 38
End: 2023-07-05
Payer: COMMERCIAL

## 2023-07-05 VITALS
HEIGHT: 64 IN | OXYGEN SATURATION: 99 % | RESPIRATION RATE: 15 BRPM | SYSTOLIC BLOOD PRESSURE: 103 MMHG | DIASTOLIC BLOOD PRESSURE: 65 MMHG | BODY MASS INDEX: 20.49 KG/M2 | HEART RATE: 65 BPM | WEIGHT: 120 LBS | TEMPERATURE: 97.9 F

## 2023-07-05 DIAGNOSIS — Z11.4 SCREENING FOR HIV (HUMAN IMMUNODEFICIENCY VIRUS): ICD-10-CM

## 2023-07-05 DIAGNOSIS — Z00.00 ROUTINE ADULT HEALTH MAINTENANCE: Primary | ICD-10-CM

## 2023-07-05 DIAGNOSIS — Z23 ENCOUNTER FOR IMMUNIZATION: ICD-10-CM

## 2023-07-05 DIAGNOSIS — Z78.9 VEGAN DIET: ICD-10-CM

## 2023-07-05 PROBLEM — R11.2 NON-INTRACTABLE VOMITING WITH NAUSEA: Status: RESOLVED | Noted: 2021-06-10 | Resolved: 2023-07-05

## 2023-07-05 PROBLEM — N10 ACUTE PYELONEPHRITIS: Status: RESOLVED | Noted: 2021-06-10 | Resolved: 2023-07-05

## 2023-07-05 PROBLEM — A41.9 SEPSIS, DUE TO UNSPECIFIED ORGANISM, UNSPECIFIED WHETHER ACUTE ORGAN DYSFUNCTION PRESENT (H): Status: RESOLVED | Noted: 2021-06-10 | Resolved: 2023-07-05

## 2023-07-05 PROBLEM — R10.9 RIGHT FLANK PAIN: Status: RESOLVED | Noted: 2021-06-10 | Resolved: 2023-07-05

## 2023-07-05 LAB
ANION GAP SERPL CALCULATED.3IONS-SCNC: 11 MMOL/L (ref 7–15)
BUN SERPL-MCNC: 9 MG/DL (ref 6–20)
CALCIUM SERPL-MCNC: 9.7 MG/DL (ref 8.6–10)
CHLORIDE SERPL-SCNC: 104 MMOL/L (ref 98–107)
CHOLEST SERPL-MCNC: 137 MG/DL
CREAT SERPL-MCNC: 0.83 MG/DL (ref 0.51–0.95)
DEPRECATED HCO3 PLAS-SCNC: 25 MMOL/L (ref 22–29)
ERYTHROCYTE [DISTWIDTH] IN BLOOD BY AUTOMATED COUNT: 11.8 % (ref 10–15)
FERRITIN SERPL-MCNC: 37 NG/ML (ref 6–175)
GFR SERPL CREATININE-BSD FRML MDRD: >90 ML/MIN/1.73M2
GLUCOSE SERPL-MCNC: 80 MG/DL (ref 70–99)
HCT VFR BLD AUTO: 39.1 % (ref 35–47)
HDLC SERPL-MCNC: 42 MG/DL
HGB BLD-MCNC: 12.5 G/DL (ref 11.7–15.7)
IRON BINDING CAPACITY (ROCHE): 287 UG/DL (ref 240–430)
IRON SATN MFR SERPL: 34 % (ref 15–46)
IRON SERPL-MCNC: 98 UG/DL (ref 37–145)
LDLC SERPL CALC-MCNC: 74 MG/DL
MCH RBC QN AUTO: 30.6 PG (ref 26.5–33)
MCHC RBC AUTO-ENTMCNC: 32 G/DL (ref 31.5–36.5)
MCV RBC AUTO: 96 FL (ref 78–100)
NONHDLC SERPL-MCNC: 95 MG/DL
PLATELET # BLD AUTO: 220 10E3/UL (ref 150–450)
POTASSIUM SERPL-SCNC: 4 MMOL/L (ref 3.4–5.3)
RBC # BLD AUTO: 4.08 10E6/UL (ref 3.8–5.2)
SODIUM SERPL-SCNC: 140 MMOL/L (ref 136–145)
TRIGL SERPL-MCNC: 103 MG/DL
TSH SERPL DL<=0.005 MIU/L-ACNC: 1.1 UIU/ML (ref 0.3–4.2)
VIT B12 SERPL-MCNC: 628 PG/ML (ref 232–1245)
WBC # BLD AUTO: 7.2 10E3/UL (ref 4–11)

## 2023-07-05 PROCEDURE — 82728 ASSAY OF FERRITIN: CPT | Performed by: PHYSICIAN ASSISTANT

## 2023-07-05 PROCEDURE — 90715 TDAP VACCINE 7 YRS/> IM: CPT | Performed by: PHYSICIAN ASSISTANT

## 2023-07-05 PROCEDURE — 80061 LIPID PANEL: CPT | Performed by: PHYSICIAN ASSISTANT

## 2023-07-05 PROCEDURE — 82306 VITAMIN D 25 HYDROXY: CPT | Performed by: PHYSICIAN ASSISTANT

## 2023-07-05 PROCEDURE — 36415 COLL VENOUS BLD VENIPUNCTURE: CPT | Performed by: PHYSICIAN ASSISTANT

## 2023-07-05 PROCEDURE — 84443 ASSAY THYROID STIM HORMONE: CPT | Performed by: PHYSICIAN ASSISTANT

## 2023-07-05 PROCEDURE — 80048 BASIC METABOLIC PNL TOTAL CA: CPT | Performed by: PHYSICIAN ASSISTANT

## 2023-07-05 PROCEDURE — 99395 PREV VISIT EST AGE 18-39: CPT | Mod: 25 | Performed by: PHYSICIAN ASSISTANT

## 2023-07-05 PROCEDURE — 83540 ASSAY OF IRON: CPT | Performed by: PHYSICIAN ASSISTANT

## 2023-07-05 PROCEDURE — 86706 HEP B SURFACE ANTIBODY: CPT | Performed by: PHYSICIAN ASSISTANT

## 2023-07-05 PROCEDURE — 83550 IRON BINDING TEST: CPT | Performed by: PHYSICIAN ASSISTANT

## 2023-07-05 PROCEDURE — 87389 HIV-1 AG W/HIV-1&-2 AB AG IA: CPT | Performed by: PHYSICIAN ASSISTANT

## 2023-07-05 PROCEDURE — 82607 VITAMIN B-12: CPT | Performed by: PHYSICIAN ASSISTANT

## 2023-07-05 PROCEDURE — 90471 IMMUNIZATION ADMIN: CPT | Performed by: PHYSICIAN ASSISTANT

## 2023-07-05 PROCEDURE — 85027 COMPLETE CBC AUTOMATED: CPT | Performed by: PHYSICIAN ASSISTANT

## 2023-07-05 ASSESSMENT — ENCOUNTER SYMPTOMS
HEARTBURN: 0
JOINT SWELLING: 0
HEMATOCHEZIA: 0
PALPITATIONS: 0
COUGH: 0
ABDOMINAL PAIN: 0
DIZZINESS: 0
PARESTHESIAS: 0
MYALGIAS: 0
HEADACHES: 0
SHORTNESS OF BREATH: 0
DIARRHEA: 0
ARTHRALGIAS: 0
NAUSEA: 0
WEAKNESS: 0
BREAST MASS: 0
HEMATURIA: 0
CHILLS: 0
NERVOUS/ANXIOUS: 0
EYE PAIN: 0
FREQUENCY: 1
FEVER: 0
CONSTIPATION: 0
SORE THROAT: 0
DYSURIA: 0

## 2023-07-05 ASSESSMENT — PAIN SCALES - GENERAL: PAINLEVEL: NO PAIN (0)

## 2023-07-05 NOTE — NURSING NOTE
Prior to immunization administration, verified patients identity using patient s name and date of birth. Please see Immunization Activity for additional information.     Screening Questionnaire for Adult Immunization    Are you sick today?   No   Do you have allergies to medications, food, a vaccine component or latex?   No   Have you ever had a serious reaction after receiving a vaccination?   No   Do you have a long-term health problem with heart, lung, kidney, or metabolic disease (e.g., diabetes), asthma, a blood disorder, no spleen, complement component deficiency, a cochlear implant, or a spinal fluid leak?  Are you on long-term aspirin therapy?   No   Do you have cancer, leukemia, HIV/AIDS, or any other immune system problem?   No   Do you have a parent, brother, or sister with an immune system problem?   No   In the past 3 months, have you taken medications that affect  your immune system, such as prednisone, other steroids, or anticancer drugs; drugs for the treatment of rheumatoid arthritis, Crohn s disease, or psoriasis; or have you had radiation treatments?   No   Have you had a seizure, or a brain or other nervous system problem?   No   During the past year, have you received a transfusion of blood or blood    products, or been given immune (gamma) globulin or antiviral drug?   No   For women: Are you pregnant or is there a chance you could become       pregnant during the next month?   No   Have you received any vaccinations in the past 4 weeks?   No     Immunization questionnaire answers were all negative.      Patient instructed to remain in clinic for 15 minutes afterwards, and to report any adverse reactions.     Screening performed by Wendi Cortez MA on 7/5/2023 at 2:51 PM.

## 2023-07-05 NOTE — PROGRESS NOTES
SUBJECTIVE:   CC: Aurora is an 37 year old who presents for preventive health visit.       7/5/2023     1:40 PM   Additional Questions   Roomed by ej zeng   Accompanied by none         7/5/2023     1:40 PM   Patient Reported Additional Medications   Patient reports taking the following new medications none     Healthy Habits:     Getting at least 3 servings of Calcium per day:  Yes    Bi-annual eye exam:  Yes    Dental care twice a year:  NO    Sleep apnea or symptoms of sleep apnea:  Daytime drowsiness    Diet:  Vegetarian/vegan    Frequency of exercise:  2-3 days/week    Duration of exercise:  15-30 minutes    Taking medications regularly:  Not Applicable    Barriers to taking medications:  None    Medication side effects:  Not applicable    Additional concerns today:  No    Aurora here today for [facility] visit    Vegan diet - wonders about B12, vit D, iron  Also notes that she has low appetite, dinner is OK but during the day doesn't have as much of an appetite  Had abnormal thyroid around the time she graduated high school - doesn't recall specific treatment but hasn't really followed up    Today's PHQ-2 Score:       7/4/2023     3:09 PM   PHQ-2 ( 1999 [employer])   Q1: Little interest or pleasure in doing things 0   Q2: Feeling down, depressed or hopeless 0   PHQ-2 Score 0   Q1: Little interest or pleasure in doing things Not at all   Q2: Feeling down, depressed or hopeless Not at all   PHQ-2 Score 0     Have you ever done Advance Care Planning? (For example, a Health Directive, POLST, or a discussion with a medical provider or your loved ones about your wishes): No, advance care planning information given to patient to review.  Patient plans to discuss their wishes with loved ones or provider.      Social History     Tobacco Use     Smoking status: Never     Passive exposure: Never     Smokeless tobacco: Never   Substance Use Topics     Alcohol use: Yes     Comment: Occasionally         7/4/2023     3:08 PM   Alcohol  Use   Prescreen: >3 drinks/day or >7 drinks/week? No     Reviewed orders with patient.  Reviewed health maintenance and updated orders accordingly - Yes    Breast Cancer Screening:    FHS-7:        No data to display              Patient under 40 years of age: Routine Mammogram Screening not recommended.   Pertinent mammograms are reviewed under the imaging tab.    History of abnormal Pap smear: NO - age 30-65 PAP every 5 years with negative HPV co-testing recommended      Latest Ref Rng & Units 12/5/2022     8:46 AM 7/18/2017    11:49 AM 7/18/2017    10:55 AM   PAP / HPV   PAP  Negative for Intraepithelial Lesion or Malignancy (NILM)      PAP (Historical)    NIL    HPV 16 DNA Negative Negative  Negative     HPV 18 DNA Negative Negative  Negative     Other HR HPV Negative Negative  Negative       Reviewed and updated as needed this visit by clinical staff   Tobacco  Allergies  Meds              Reviewed and updated as needed this visit by Provider                   Review of Systems   Constitutional: Negative for chills and fever.   HENT: Negative for congestion, ear pain, hearing loss and sore throat.    Eyes: Negative for pain and visual disturbance.   Respiratory: Negative for cough and shortness of breath.    Cardiovascular: Negative for chest pain, palpitations and peripheral edema.   Gastrointestinal: Negative for abdominal pain, constipation, diarrhea, heartburn, hematochezia and nausea.   Breasts:  Negative for tenderness, breast mass and discharge.   Genitourinary: Positive for frequency. Negative for dysuria, genital sores, hematuria, pelvic pain, urgency, vaginal bleeding and vaginal discharge.   Musculoskeletal: Negative for arthralgias, joint swelling and myalgias.   Skin: Negative for rash.   Neurological: Negative for dizziness, weakness, headaches and paresthesias.   Psychiatric/Behavioral: Negative for mood changes. The patient is not nervous/anxious.         OBJECTIVE:   /65 (BP Location:  "Right arm, Patient Position: Chair, Cuff Size: Adult Regular)   Pulse 65   Temp 97.9  F (36.6  C) (Temporal)   Resp 15   Ht 1.626 m (5' 4\")   Wt 54.4 kg (120 lb)   LMP 06/27/2023 (Exact Date)   SpO2 99%   BMI 20.60 kg/m    Physical Exam  GENERAL: healthy, alert and no distress  EYES: Eyes grossly normal to inspection, PERRL and conjunctivae and sclerae normal  HENT: ear canals and TM's normal, nose and mouth without ulcers or lesions  NECK: no adenopathy, no asymmetry, masses, or scars and thyroid normal to palpation  RESP: lungs clear to auscultation - no rales, rhonchi or wheezes  BREAST: normal without masses, tenderness or nipple discharge and no palpable axillary masses or adenopathy  CV: regular rate and rhythm, normal S1 S2, no S3 or S4, no murmur, click or rub, no peripheral edema and peripheral pulses strong  ABDOMEN: soft, nontender, no hepatosplenomegaly, no masses and bowel sounds normal  MS: no gross musculoskeletal defects noted, no edema  SKIN: no suspicious lesions or rashes  NEURO: Normal strength and tone, mentation intact and speech normal  PSYCH: mentation appears normal, affect normal/bright      ASSESSMENT/PLAN:   Aurora was seen today for physical.    Diagnoses and all orders for this visit:    Routine adult health maintenance  -     REVIEW OF HEALTH MAINTENANCE PROTOCOL ORDERS  -     CBC with platelets; Future  -     Basic metabolic panel; Future  -     Lipid panel reflex to direct LDL Non-fasting; Future  -     TSH with free T4 reflex; Future  -     Hepatitis B Surface Antibody; Future    Vegan diet  -     Vitamin B12; Future  -     Iron and iron binding capacity; Future  -     Ferritin; Future  -     Vitamin D Deficiency; Future    Screening for HIV (human immunodeficiency virus)  -     HIV Antigen Antibody Combo; Future    Encounter for immunization  -     TDAP VACCINE (Adacel, Boostrix)      COUNSELING:  Reviewed preventive health counseling, as reflected in patient " instructions        She reports that she has never smoked. She has never been exposed to tobacco smoke. She has never used smokeless tobacco.          ZELALEM Jarvis [facility]

## 2023-07-06 LAB
DEPRECATED CALCIDIOL+CALCIFEROL SERPL-MC: 36 UG/L (ref 20–75)
HBV SURFACE AB SERPL IA-ACNC: >1000 M[IU]/ML
HBV SURFACE AB SERPL IA-ACNC: REACTIVE M[IU]/ML
HIV 1+2 AB+HIV1 P24 AG SERPL QL IA: NONREACTIVE

## 2023-08-18 ENCOUNTER — HOSPITAL ENCOUNTER (EMERGENCY)
Age: 38
Discharge: HOME | End: 2023-08-18
Payer: COMMERCIAL

## 2023-08-18 VITALS
HEART RATE: 81 BPM | TEMPERATURE: 98 F | OXYGEN SATURATION: 99 % | DIASTOLIC BLOOD PRESSURE: 89 MMHG | SYSTOLIC BLOOD PRESSURE: 126 MMHG | RESPIRATION RATE: 16 BRPM

## 2023-08-18 DIAGNOSIS — N76.0 BV (BACTERIAL VAGINOSIS): Primary | ICD-10-CM

## 2023-08-18 DIAGNOSIS — B96.89 BV (BACTERIAL VAGINOSIS): Primary | ICD-10-CM

## 2023-08-18 PROCEDURE — 99283 EMERGENCY DEPT VISIT LOW MDM: CPT

## 2023-08-18 RX ORDER — METRONIDAZOLE 500 MG/1
500 TABLET ORAL 2 TIMES DAILY
Qty: 14 TABLET | Refills: 0 | Status: SHIPPED | OUTPATIENT
Start: 2023-08-18 | End: 2023-08-25

## 2023-08-18 RX ORDER — FLUCONAZOLE 150 MG/1
150 TABLET ORAL ONCE
Qty: 1 TABLET | Refills: 0 | Status: SHIPPED | OUTPATIENT
Start: 2023-08-18 | End: 2023-08-18

## 2023-08-18 NOTE — ED NOTES
Pt to the ED via self. Patient presents with complaints of vaginitis. Patient states that this has been occurring for around 32 days. Patient states she was in [facility] for 18 of those days and her  wouldn't let her go to urgent care. Patient states she took Flagyl for 3 days (7/31/2023 - 8/2/2023). Patient is alert and oriented x 4. Respirations are regular and unlabored. Call light within reach.      Catia Reagan RN  08/18/23 [id number]

## 2023-08-18 NOTE — DISCHARGE INSTRUCTIONS
Rest, stay well-hydrated. Continue home medication as previously prescribed. Flagyl twice daily for the next 7 days till complete. 1 tablet of Diflucan after antibiotics are complete. Follow-up with [facility] within the next 1 to 2 weeks for reevaluation. If any worsening or concerns return to the ER immediately.

## 2024-09-15 ENCOUNTER — HEALTH MAINTENANCE LETTER (OUTPATIENT)
Age: 39
End: 2024-09-15

## 2024-10-21 ENCOUNTER — APPOINTMENT (OUTPATIENT)
Dept: CT IMAGING | Age: 39
End: 2024-10-21
Payer: OTHER MISCELLANEOUS

## 2024-10-21 ENCOUNTER — APPOINTMENT (OUTPATIENT)
Dept: GENERAL RADIOLOGY | Age: 39
End: 2024-10-21
Payer: OTHER MISCELLANEOUS

## 2024-10-21 ENCOUNTER — HOSPITAL ENCOUNTER (EMERGENCY)
Age: 39
Discharge: HOME | End: 2024-10-21
Attending: EMERGENCY MEDICINE
Payer: OTHER MISCELLANEOUS

## 2024-10-21 VITALS
BODY MASS INDEX: 34.15 KG/M2 | RESPIRATION RATE: 16 BRPM | HEIGHT: 64 IN | HEART RATE: 84 BPM | WEIGHT: 200 LBS | TEMPERATURE: 98.6 F | OXYGEN SATURATION: 100 % | DIASTOLIC BLOOD PRESSURE: 85 MMHG | SYSTOLIC BLOOD PRESSURE: 119 MMHG

## 2024-10-21 DIAGNOSIS — M25.561 ACUTE PAIN OF BOTH KNEES: ICD-10-CM

## 2024-10-21 DIAGNOSIS — M25.562 ACUTE PAIN OF BOTH KNEES: ICD-10-CM
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
## 2024-10-21 DIAGNOSIS — S39.012A STRAIN OF LUMBAR REGION, INITIAL ENCOUNTER: ICD-10-CM

## 2024-10-21 LAB
ALBUMIN SERPL BCG-MCNC: 4.1 G/DL (ref 3.5–5.1)
ALP SERPL-CCNC: 92 U/L (ref 38–126)
ALT SERPL W/O P-5'-P-CCNC: 24 U/L (ref 11–66)
ANION GAP SERPL CALC-SCNC: 10 MEQ/L (ref 8–16)
AST SERPL-CCNC: 26 U/L (ref 5–40)
B-HCG SERPL QL: NEGATIVE
BASOPHILS ABSOLUTE: 0 THOU/MM3 (ref 0–0.1)
BASOPHILS NFR BLD AUTO: 0.3 %
BILIRUB SERPL-MCNC: 0.3 MG/DL (ref 0.3–1.2)
BUN SERPL-MCNC: 8 MG/DL (ref 7–22)
CALCIUM SERPL-MCNC: 9.1 MG/DL (ref 8.5–10.5)
CHLORIDE SERPL-SCNC: 103 MEQ/L (ref 98–111)
CO2 SERPL-SCNC: 23 MEQ/L (ref 23–33)
CREAT SERPL-MCNC: 0.6 MG/DL (ref 0.4–1.2)
DEPRECATED RDW RBC AUTO: 41.8 FL (ref 35–45)
EOSINOPHIL NFR BLD AUTO: 1.2 %
EOSINOPHILS ABSOLUTE: 0.1 THOU/MM3 (ref 0–0.4)
ERYTHROCYTE [DISTWIDTH] IN BLOOD BY AUTOMATED COUNT: 12.2 % (ref 11.5–14.5)
GFR SERPL CREATININE-BSD FRML MDRD: > 90 ML/MIN/1.73M2
GLUCOSE SERPL-MCNC: 96 MG/DL (ref 70–108)
HCT VFR BLD AUTO: 41.8 % (ref 37–47)
HGB BLD-MCNC: 13.8 GM/DL (ref 12–16)
IMM GRANULOCYTES # BLD AUTO: 0.02 THOU/MM3 (ref 0–0.07)
IMM GRANULOCYTES NFR BLD AUTO: 0.2 %
LYMPHOCYTES ABSOLUTE: 2.2 THOU/MM3 (ref 1–4.8)
LYMPHOCYTES NFR BLD AUTO: 22.7 %
MCH RBC QN AUTO: 30.5 PG (ref 26–33)
MCHC RBC AUTO-ENTMCNC: 33 GM/DL (ref 32.2–35.5)
MCV RBC AUTO: 92.5 FL (ref 81–99)
MONOCYTES ABSOLUTE: 0.4 THOU/MM3 (ref 0.4–1.3)
MONOCYTES NFR BLD AUTO: 3.9 %
NEUTROPHILS ABSOLUTE: 7.1 THOU/MM3 (ref 1.8–7.7)
NEUTROPHILS NFR BLD AUTO: 71.7 %
NRBC BLD AUTO-RTO: 0 /100 WBC
OSMOLALITY SERPL CALC.SUM OF ELEC: 270.2 MOSMOL/KG (ref 275–300)
PLATELET # BLD AUTO: 360 THOU/MM3 (ref 130–400)
PMV BLD AUTO: 10.2 FL (ref 9.4–12.4)
POTASSIUM SERPL-SCNC: 4.2 MEQ/L (ref 3.5–5.2)
PROT SERPL-MCNC: 7.7 G/DL (ref 6.1–8)
RBC # BLD AUTO: 4.52 MILL/MM3 (ref 4.2–5.4)
SODIUM SERPL-SCNC: 136 MEQ/L (ref 135–145)
WBC # BLD AUTO: 9.9 THOU/MM3 (ref 4.8–10.8)

## 2024-10-21 PROCEDURE — 72125 CT NECK SPINE W/O DYE: CPT

## 2024-10-21 PROCEDURE — 6360000002 HC RX W HCPCS: Performed by: EMERGENCY MEDICINE

## 2024-10-21 PROCEDURE — 96374 THER/PROPH/DIAG INJ IV PUSH: CPT

## 2024-10-21 PROCEDURE — 76376 3D RENDER W/INTRP POSTPROCES: CPT

## 2024-10-21 PROCEDURE — 6360000002 HC RX W HCPCS

## 2024-10-21 PROCEDURE — 99285 EMERGENCY DEPT VISIT HI MDM: CPT

## 2024-10-21 PROCEDURE — 36415 COLL VENOUS BLD VENIPUNCTURE: CPT

## 2024-10-21 PROCEDURE — 85025 COMPLETE CBC W/AUTO DIFF WBC: CPT

## 2024-10-21 PROCEDURE — 80053 COMPREHEN METABOLIC PANEL: CPT

## 2024-10-21 PROCEDURE — 70450 CT HEAD/BRAIN W/O DYE: CPT

## 2024-10-21 PROCEDURE — 6370000000 HC RX 637 (ALT 250 FOR IP)

## 2024-10-21 PROCEDURE — 73564 X-RAY EXAM KNEE 4 OR MORE: CPT

## 2024-10-21 PROCEDURE — 6360000004 HC RX CONTRAST MEDICATION

## 2024-10-21 PROCEDURE — 96375 TX/PRO/DX INJ NEW DRUG ADDON: CPT

## 2024-10-21 PROCEDURE — 84703 CHORIONIC GONADOTROPIN ASSAY: CPT

## 2024-10-21 PROCEDURE — 74177 CT ABD & PELVIS W/CONTRAST: CPT

## 2024-10-21 PROCEDURE — 71260 CT THORAX DX C+: CPT

## 2024-10-21 RX ORDER — ORPHENADRINE CITRATE 30 MG/ML
60 INJECTION INTRAMUSCULAR; INTRAVENOUS ONCE
Status: COMPLETED | OUTPATIENT
Start: 2024-10-21 | End: 2024-10-21

## 2024-10-21 RX ORDER — CYCLOBENZAPRINE HCL 10 MG
10 TABLET ORAL 3 TIMES DAILY PRN
Qty: 21 TABLET | Refills: 0 | Status: SHIPPED | OUTPATIENT
Start: 2024-10-21 | End: 2024-10-28

## 2024-10-21 RX ORDER — LIDOCAINE 4 G/G
1 PATCH TOPICAL DAILY
Qty: 30 PATCH | Refills: 0 | Status: SHIPPED | OUTPATIENT
Start: 2024-10-21 | End: 2024-11-20

## 2024-10-21 RX ORDER — IOPAMIDOL 755 MG/ML
80 INJECTION, SOLUTION INTRAVASCULAR
Status: COMPLETED | OUTPATIENT
Start: 2024-10-21 | End: 2024-10-21

## 2024-10-21 RX ORDER — HYDROCODONE BITARTRATE AND ACETAMINOPHEN 5; 325 MG/1; MG/1
1 TABLET ORAL EVERY 6 HOURS PRN
Qty: 12 TABLET | Refills: 0 | Status: SHIPPED | OUTPATIENT
Start: 2024-10-21 | End: 2024-10-24

## 2024-10-21 RX ORDER — HYDROCODONE BITARTRATE AND ACETAMINOPHEN 5; 325 MG/1; MG/1
1 TABLET ORAL ONCE
Status: COMPLETED | OUTPATIENT
Start: 2024-10-21 | End: 2024-10-21

## 2024-10-21 RX ORDER — FENTANYL CITRATE 50 UG/ML
50 INJECTION, SOLUTION INTRAMUSCULAR; INTRAVENOUS ONCE
Status: COMPLETED | OUTPATIENT
Start: 2024-10-21 | End: 2024-10-21

## 2024-10-21 RX ORDER — LIDOCAINE 4 G/G
1 PATCH TOPICAL ONCE
Status: DISCONTINUED | OUTPATIENT
Start: 2024-10-21 | End: 2024-10-21 | Stop reason: HOSPADM

## 2024-10-21 RX ORDER — MORPHINE SULFATE 4 MG/ML
4 INJECTION, SOLUTION INTRAMUSCULAR; INTRAVENOUS ONCE
Status: COMPLETED | OUTPATIENT
Start: 2024-10-21 | End: 2024-10-21

## 2024-10-21 RX ADMIN — FENTANYL CITRATE 50 MCG: 50 INJECTION, SOLUTION INTRAMUSCULAR; INTRAVENOUS at 12:34

## 2024-10-21 RX ADMIN — ORPHENADRINE CITRATE 60 MG: 60 INJECTION INTRAMUSCULAR; INTRAVENOUS at 12:37

## 2024-10-21 RX ADMIN — MORPHINE SULFATE 4 MG: 4 INJECTION, SOLUTION INTRAMUSCULAR; INTRAVENOUS at 13:23

## 2024-10-21 RX ADMIN — IOPAMIDOL 80 ML: 755 INJECTION, SOLUTION INTRAVENOUS at 14:02

## 2024-10-21 RX ADMIN — HYDROCODONE BITARTRATE AND ACETAMINOPHEN 1 TABLET: 5; 325 TABLET ORAL at 15:56

## 2024-10-21 ASSESSMENT — PAIN - FUNCTIONAL ASSESSMENT: PAIN_FUNCTIONAL_ASSESSMENT: 0-10

## 2024-10-21 ASSESSMENT — PAIN SCALES - GENERAL
PAINLEVEL_OUTOF10: 4
PAINLEVEL_OUTOF10: 4

## 2024-10-21 ASSESSMENT — PAIN DESCRIPTION - LOCATION: LOCATION: KNEE

## 2024-10-21 ASSESSMENT — PAIN DESCRIPTION - ORIENTATION: ORIENTATION: LEFT;RIGHT

## 2024-10-21 NOTE — ED PROVIDER NOTES
HYDROcodone-acetaminophen (NORCO) 5-325 MG per tablet, Take 1 tablet by mouth every 6 hours as needed for Pain for up to 3 days. Intended supply: 3 days. Take lowest dose possible to manage pain Max Daily Amount: 4 tablets, Disp: 12 tablet, Rfl: 0    cyclobenzaprine (FLEXERIL) 10 MG tablet, Take 1 tablet by mouth 3 times daily as needed for Muscle spasms, Disp: 21 tablet, Rfl: 0    lidocaine 4 % external patch, Place 1 patch onto the skin daily, Disp: 30 patch, Rfl: 0    Discharge Medication List as of 10/21/2024  3:51 PM            SOCIAL HISTORY     Social History     Social History Narrative    Not on file     Social History     Tobacco Use    Smoking status: Every Day     Current packs/day: 0.50     Average packs/day: 0.5 packs/day for 10.0 years (5.0 ttl pk-yrs)     Types: Cigarettes    Smokeless tobacco: Never   Vaping Use    Vaping status: Never Used   Substance Use Topics    Alcohol use: Yes     Alcohol/week: 2.0 standard drinks of alcohol     Types: 2 Shots of liquor per week     Comment: Seldom    Drug use: No     Comment: last about September 2014         ALLERGIES   No Known Allergies      FAMILY HISTORY     Family History   Problem Relation Age of Onset    Cancer Mother         cervix    Heart Disease Father          PHYSICAL EXAM     ED Triage Vitals [10/21/24 1128]   BP Systolic BP Percentile Diastolic BP Percentile Temp Temp Source Pulse Respirations SpO2   (!) 137/98 -- -- 98.6 °F (37 °C) Oral 100 16 97 %      Height Weight - Scale         1.626 m (5' 4\") 90.7 kg (200 lb)           Initial vital signs and nursing assessment reviewed. Body mass index is 34.33 kg/m².     Additional Vital Signs:  Vitals:    10/21/24 1503   BP: 119/85   Pulse: 84   Resp: 16   Temp:    SpO2: 100%       Physical Exam  Vitals and nursing note reviewed.   Constitutional:       General: She is not in acute distress.     Appearance: She is well-developed. She is not toxic-appearing or diaphoretic.   HENT:      Head:

## 2024-10-21 NOTE — ED NOTES
Report received from Naty DEL REAL. Pt in bed, eyes open, respirations even and unlabored. Vital signs reassessed, pt medicated per MAR. No needs voiced.
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
## 2024-10-21 NOTE — ED NOTES
Pt in bed, eyes open, respirations even and unlabored. Vital signs reassessed, no needs voiced.    Topical Retinoid counseling:  Patient advised to apply a pea-sized amount only at bedtime and wait 30 minutes after washing their face before applying.  If too drying, patient may add a non-comedogenic moisturizer. The patient verbalized understanding of the proper use and possible adverse effects of retinoids.  All of the patient's questions and concerns were addressed.
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
## 2024-12-02 SDOH — HEALTH STABILITY: PHYSICAL HEALTH: ON AVERAGE, HOW MANY MINUTES DO YOU ENGAGE IN EXERCISE AT THIS LEVEL?: 30 MIN

## 2024-12-02 SDOH — HEALTH STABILITY: PHYSICAL HEALTH: ON AVERAGE, HOW MANY DAYS PER WEEK DO YOU ENGAGE IN MODERATE TO STRENUOUS EXERCISE (LIKE A BRISK WALK)?: 5 DAYS

## 2024-12-02 ASSESSMENT — SOCIAL DETERMINANTS OF HEALTH (SDOH): HOW OFTEN DO YOU GET TOGETHER WITH FRIENDS OR RELATIVES?: TWICE A WEEK

## 2025-02-26 ENCOUNTER — OFFICE VISIT (OUTPATIENT)
Dept: FAMILY MEDICINE | Facility: CLINIC | Age: 40
End: 2025-02-26
Payer: COMMERCIAL

## 2025-02-26 VITALS
HEART RATE: 71 BPM | OXYGEN SATURATION: 100 % | RESPIRATION RATE: 18 BRPM | SYSTOLIC BLOOD PRESSURE: 110 MMHG | TEMPERATURE: 98.2 F | HEIGHT: 64 IN | BODY MASS INDEX: 20.9 KG/M2 | DIASTOLIC BLOOD PRESSURE: 64 MMHG | WEIGHT: 122.4 LBS

## 2025-02-26 DIAGNOSIS — Z78.9 VEGAN DIET: ICD-10-CM

## 2025-02-26 DIAGNOSIS — Z00.00 ROUTINE GENERAL MEDICAL EXAMINATION AT A HEALTH CARE FACILITY: Primary | ICD-10-CM

## 2025-02-26 DIAGNOSIS — Z23 ENCOUNTER FOR IMMUNIZATION: ICD-10-CM

## 2025-02-26 LAB
ERYTHROCYTE [DISTWIDTH] IN BLOOD BY AUTOMATED COUNT: 11.7 % (ref 10–15)
HCT VFR BLD AUTO: 37.4 % (ref 35–47)
HGB BLD-MCNC: 12.2 G/DL (ref 11.7–15.7)
MCH RBC QN AUTO: 30.9 PG (ref 26.5–33)
MCHC RBC AUTO-ENTMCNC: 32.6 G/DL (ref 31.5–36.5)
MCV RBC AUTO: 95 FL (ref 78–100)
PLATELET # BLD AUTO: 199 10E3/UL (ref 150–450)
RBC # BLD AUTO: 3.95 10E6/UL (ref 3.8–5.2)
WBC # BLD AUTO: 6.2 10E3/UL (ref 4–11)

## 2025-02-26 PROCEDURE — 85027 COMPLETE CBC AUTOMATED: CPT | Performed by: PHYSICIAN ASSISTANT

## 2025-02-26 PROCEDURE — 83540 ASSAY OF IRON: CPT | Performed by: PHYSICIAN ASSISTANT

## 2025-02-26 PROCEDURE — 90480 ADMN SARSCOV2 VAC 1/ONLY CMP: CPT | Performed by: PHYSICIAN ASSISTANT

## 2025-02-26 PROCEDURE — 91320 SARSCV2 VAC 30MCG TRS-SUC IM: CPT | Performed by: PHYSICIAN ASSISTANT

## 2025-02-26 PROCEDURE — 82607 VITAMIN B-12: CPT | Performed by: PHYSICIAN ASSISTANT

## 2025-02-26 PROCEDURE — 82728 ASSAY OF FERRITIN: CPT | Performed by: PHYSICIAN ASSISTANT

## 2025-02-26 PROCEDURE — 1126F AMNT PAIN NOTED NONE PRSNT: CPT | Performed by: PHYSICIAN ASSISTANT

## 2025-02-26 PROCEDURE — 82306 VITAMIN D 25 HYDROXY: CPT | Performed by: PHYSICIAN ASSISTANT

## 2025-02-26 PROCEDURE — 3074F SYST BP LT 130 MM HG: CPT | Performed by: PHYSICIAN ASSISTANT

## 2025-02-26 PROCEDURE — 80053 COMPREHEN METABOLIC PANEL: CPT | Performed by: PHYSICIAN ASSISTANT

## 2025-02-26 PROCEDURE — 99395 PREV VISIT EST AGE 18-39: CPT | Performed by: PHYSICIAN ASSISTANT

## 2025-02-26 PROCEDURE — 36415 COLL VENOUS BLD VENIPUNCTURE: CPT | Performed by: PHYSICIAN ASSISTANT

## 2025-02-26 PROCEDURE — 83550 IRON BINDING TEST: CPT | Performed by: PHYSICIAN ASSISTANT

## 2025-02-26 PROCEDURE — 80061 LIPID PANEL: CPT | Performed by: PHYSICIAN ASSISTANT

## 2025-02-26 PROCEDURE — 3078F DIAST BP <80 MM HG: CPT | Performed by: PHYSICIAN ASSISTANT

## 2025-02-26 SDOH — HEALTH STABILITY: PHYSICAL HEALTH: ON AVERAGE, HOW MANY MINUTES DO YOU ENGAGE IN EXERCISE AT THIS LEVEL?: 30 MIN

## 2025-02-26 SDOH — HEALTH STABILITY: PHYSICAL HEALTH: ON AVERAGE, HOW MANY DAYS PER WEEK DO YOU ENGAGE IN MODERATE TO STRENUOUS EXERCISE (LIKE A BRISK WALK)?: 5 DAYS

## 2025-02-26 ASSESSMENT — PAIN SCALES - GENERAL: PAINLEVEL_OUTOF10: NO PAIN (0)

## 2025-02-26 ASSESSMENT — SOCIAL DETERMINANTS OF HEALTH (SDOH): HOW OFTEN DO YOU GET TOGETHER WITH FRIENDS OR RELATIVES?: TWICE A WEEK

## 2025-02-26 NOTE — PATIENT INSTRUCTIONS
Patient Education   Preventive Care Advice   This is general advice given by our system to help you stay healthy. However, your care team may have specific advice just for you. Please talk to your care team about your preventive care needs.  Nutrition  Eat 5 or more servings of fruits and vegetables each day.  Try wheat bread, brown rice and whole grain pasta (instead of white bread, rice, and pasta).  Get enough calcium and vitamin D. Check the label on foods and aim for 100% of the RDA (recommended daily allowance).  Lifestyle  Exercise at least 150 minutes each week  (30 minutes a day, 5 days a week).  Do muscle strengthening activities 2 days a week. These help control your weight and prevent disease.  No smoking.  Wear sunscreen to prevent skin cancer.  Have a dental exam and cleaning every 6 months.  Yearly exams  See your health care team every year to talk about:  Any changes in your health.  Any medicines your care team has prescribed.  Preventive care, family planning, and ways to prevent chronic diseases.  Shots (vaccines)   HPV shots (up to age 26), if you've never had them before.  Hepatitis B shots (up to age 59), if you've never had them before.  COVID-19 shot: Get this shot when it's due.  Flu shot: Get a flu shot every year.  Tetanus shot: Get a tetanus shot every 10 years.  Pneumococcal, hepatitis A, and RSV shots: Ask your care team if you need these based on your risk.  Shingles shot (for age 50 and up)  General health tests  Diabetes screening:  Starting at age 35, Get screened for diabetes at least every 3 years.  If you are younger than age 35, ask your care team if you should be screened for diabetes.  Cholesterol test: At age 39, start having a cholesterol test every 5 years, or more often if advised.  Bone density scan (DEXA): At age 50, ask your care team if you should have this scan for osteoporosis (brittle bones).  Hepatitis C: Get tested at least once in your life.  STIs (sexually  transmitted infections)  Before age 24: Ask your care team if you should be screened for STIs.  After age 24: Get screened for STIs if you're at risk. You are at risk for STIs (including HIV) if:  You are sexually active with more than one person.  You don't use condoms every time.  You or a partner was diagnosed with a sexually transmitted infection.  If you are at risk for HIV, ask about PrEP medicine to prevent HIV.  Get tested for HIV at least once in your life, whether you are at risk for HIV or not.  Cancer screening tests  Cervical cancer screening: If you have a cervix, begin getting regular cervical cancer screening tests starting at age 21.  Breast cancer scan (mammogram): If you've ever had breasts, begin having regular mammograms starting at age 40. This is a scan to check for breast cancer.  Colon cancer screening: It is important to start screening for colon cancer at age 45.  Have a colonoscopy test every 10 years (or more often if you're at risk) Or, ask your provider about stool tests like a FIT test every year or Cologuard test every 3 years.  To learn more about your testing options, visit:   .  For help making a decision, visit:   https://bit.ly/wd41003.  Prostate cancer screening test: If you have a prostate, ask your care team if a prostate cancer screening test (PSA) at age 55 is right for you.  Lung cancer screening: If you are a current or former smoker ages 50 to 80, ask your care team if ongoing lung cancer screenings are right for you.  For informational purposes only. Not to replace the advice of your health care provider. Copyright   2023 [facility]. All rights reserved. Clinically reviewed by the [facility]itions Program. [employer] [id number] - REV 01/24.  Learning About Stress  What is stress?     Stress is your body's response to a hard situation. Your body can have a physical, emotional, or mental response. Stress is a fact of life for most people, and it  affects everyone differently. What causes stress for you may not be stressful for someone else.  A lot of things can cause stress. You may feel stress when you go on a job interview, take a test, or run a race. This kind of short-term stress is normal and even useful. It can help you if you need to work hard or react quickly. For example, stress can help you finish an important job on time.  Long-term stress is caused by ongoing stressful situations or events. Examples of long-term stress include long-term health problems, ongoing problems at work, or conflicts in your family. Long-term stress can harm your health.  How does stress affect your health?  When you are stressed, your body responds as though you are in danger. It makes hormones that speed up your heart, make you breathe faster, and give you a burst of energy. This is called the fight-or-flight stress response. If the stress is over quickly, your body goes back to normal and no harm is done.  But if stress happens too often or lasts too long, it can have bad effects. Long-term stress can make you more likely to get sick, and it can make symptoms of some diseases worse. If you tense up when you are stressed, you may develop neck, shoulder, or low back pain. Stress is linked to high blood pressure and heart disease.  Stress also harms your emotional health. It can make you guadarrama, tense, or depressed. Your relationships may suffer, and you may not do well at work or school.  What can you do to manage stress?  You can try these things to help manage stress:   Do something active. Exercise or activity can help reduce stress. Walking is a great way to get started. Even everyday activities such as housecleaning or yard work can help.  Try yoga or linus chi. These techniques combine exercise and meditation. You may need some training at first to learn them.  Do something you enjoy. For example, listen to music or go to a movie. Practice your hobby or do volunteer  "work.  Meditate. This can help you relax, because you are not worrying about what happened before or what may happen in the future.  Do guided imagery. Imagine yourself in any setting that helps you feel calm. You can use online videos, books, or a teacher to guide you.  Do breathing exercises. For example:  From a standing position, bend forward from the waist with your knees slightly bent. Let your arms dangle close to the floor.  Breathe in slowly and deeply as you return to a standing position. Roll up slowly and lift your head last.  Hold your breath for just a few seconds in the standing position.  Breathe out slowly and bend forward from the waist.  Let your feelings out. Talk, laugh, cry, and express anger when you need to. Talking with supportive friends or family, a counselor, or a herman leader about your feelings is a healthy way to relieve stress. Avoid discussing your feelings with people who make you feel worse.  Write. It may help to write about things that are bothering you. This helps you find out how much stress you feel and what is causing it. When you know this, you can find better ways to cope.  What can you do to prevent stress?  You might try some of these things to help prevent stress:  Manage your time. This helps you find time to do the things you want and need to do.  Get enough sleep. Your body recovers from the stresses of the day while you are sleeping.  Get support. Your family, friends, and community can make a difference in how you experience stress.  Limit your news feed. Avoid or limit time on social media or news that may make you feel stressed.  Do something active. Exercise or activity can help reduce stress. Walking is a great way to get started.  Where can you learn more?  Go to https://www.[employer].com.net/patiented  Enter N032 in the search box to learn more about \"Learning About Stress.\"  Current as of: October 24, 2023  Content Version: 14.3    2024 [employer]. "   Care instructions adapted under license by your healthcare professional. If you have questions about a medical condition or this instruction, always ask your healthcare professional. [employer] disclaims any warranty or liability for your use of this information.

## 2025-02-26 NOTE — PROGRESS NOTES
Preventive Care Visit  [facility]  Pilar Marks PA-C, Physician Assistant - Medical  Feb 26, 2025      Assessment & Plan     Routine general medical examination at a health care facility  - REVIEW OF HEALTH MAINTENANCE PROTOCOL ORDERS  - CBC with platelets; Future  - Lipid panel reflex to direct LDL Non-fasting; Future  - CBC with platelets  - Lipid panel reflex to direct LDL Non-fasting    Vegan diet  - Vitamin B12; Future  - Ferritin; Future  - Iron and iron binding capacity; Future  - Vitamin D Deficiency; Future  - Comprehensive metabolic panel (BMP + Alb, Alk Phos, ALT, AST, Total. Bili, TP); Future  - Vitamin B12  - Ferritin  - Iron and iron binding capacity  - Vitamin D Deficiency  - Comprehensive metabolic panel (BMP + Alb, Alk Phos, ALT, AST, Total. Bili, TP)    Encounter for immunization  - COVID-19 12+ (PFIZER)    Counseling  Appropriate preventive services were addressed with this patient via screening, questionnaire, or discussion as appropriate for fall prevention, nutrition, physical activity, Tobacco-use cessation, social engagement, weight loss and cognition.  Checklist reviewing preventive services available has been given to the patient.  Reviewed patient's diet, addressing concerns and/or questions.   The patient was instructed to see the dentist every 6 months.   She is at risk for psychosocial distress and has been provided with information to reduce risk.         Adwoa Simon is a 39 year old, presenting for the following:  Physical        2/26/2025     3:12 PM   Additional Questions   Roomed by Neri/Simran   Accompanied by Mitchel Simon here today for M visit  No particular concerns    HPI  Health Care Directive  Patient does not have a Health Care Directive: Discussed advance care planning with patient; information given to patient to review.      2/26/2025   General Health   How would you rate your overall physical health? Good   Feel stress (tense,  anxious, or unable to sleep) Rather much   (!) STRESS CONCERN      2/26/2025   Nutrition   Three or more servings of calcium each day? Yes   Diet: Vegetarian/vegan   How many servings of fruit and vegetables per day? (!) 2-3   How many sweetened beverages each day? 0-1         2/26/2025   Exercise   Days per week of moderate/strenous exercise 5 days   Average minutes spent exercising at this level 30 min         2/26/2025   Social Factors   Frequency of gathering with friends or relatives Twice a week   Worry food won't last until get money to buy more No   Food not last or not have enough money for food? No   Do you have housing? (Housing is defined as stable permanent housing and does not include staying ouside in a car, in a tent, in an abandoned building, in an overnight shelter, or couch-surfing.) Yes   Are you worried about losing your housing? No   Lack of transportation? No   Unable to get utilities (heat,electricity)? No         2/26/2025   Dental   Dentist two times every year? (!) NO         12/2/2024   TB Screening   Were you born outside of the [place]? No     Today's PHQ-2 Score:       2/26/2025     2:49 PM   PHQ-2 ( 1999 [employer])   Q1: Little interest or pleasure in doing things 0    Q2: Feeling down, depressed or hopeless 0    PHQ-2 Score 0    Q1: Little interest or pleasure in doing things Not at all   Q2: Feeling down, depressed or hopeless Not at all   PHQ-2 Score 0       Proxy-reported         2/26/2025   Substance Use   Alcohol more than 3/day or more than 7/wk No   Do you use any other substances recreationally? (!) CANNABIS PRODUCTS     Social History     Tobacco Use    Smoking status: Never     Passive exposure: Never    Smokeless tobacco: Never   Vaping Use    Vaping status: Never Used   Substance Use Topics    Alcohol use: Yes     Comment: Occasionally    Drug use: Yes     Types: Marijuana     Comment: Occasionally          Mammogram Screening - Patient under 40 years of age: Routine Mammogram  "Screening not recommended.         2/26/2025   STI Screening   New sexual partner(s) since last STI/HIV test? No     History of abnormal Pap smear: YES - reflected in Problem List and Health Maintenance accordingly        Latest Ref Rng & Units 12/5/2022     8:46 AM 7/18/2017    11:49 AM 7/18/2017    10:55 AM   PAP / HPV   PAP  Negative for Intraepithelial Lesion or Malignancy (NILM)      PAP (Historical)    NIL    HPV 16 DNA Negative Negative  Negative     HPV 18 DNA Negative Negative  Negative     Other HR HPV Negative Negative  Negative             2/26/2025   Contraception/Family Planning   Questions about contraception or family planning No        Reviewed and updated as needed this visit by Provider   Tobacco   Meds  Problems  Med Hx  Surg Hx  Fam Hx               Objective    Exam  /64 (BP Location: Right arm, Patient Position: Sitting, Cuff Size: Adult Small)   Pulse 71   Temp 98.2  F (36.8  C) (Temporal)   Resp 18   Ht 1.616 m (5' 3.62\")   Wt 55.5 kg (122 lb 6.4 oz)   LMP 02/20/2025 (Exact Date)   SpO2 100%   BMI 21.26 kg/m     Estimated body mass index is 21.26 kg/m  as calculated from the following:    Height as of this encounter: 1.616 m (5' 3.62\").    Weight as of this encounter: 55.5 kg (122 lb 6.4 oz).    Physical Exam  GENERAL: alert and no distress  EYES: Eyes grossly normal to inspection, PERRL and conjunctivae and sclerae normal  HENT: ear canals and TM's normal, nose and mouth without ulcers or lesions  NECK: no adenopathy, no asymmetry, masses, or scars  RESP: lungs clear to auscultation - no rales, rhonchi or wheezes  CV: regular rate and rhythm, normal S1 S2, no S3 or S4, no murmur, click or rub, no peripheral edema  ABDOMEN: soft, nontender, no hepatosplenomegaly, no masses and bowel sounds normal  MS: no gross musculoskeletal defects noted, no edema  SKIN: no suspicious lesions or rashes  NEURO: Normal strength and tone, mentation intact and speech normal  PSYCH: " mentation appears normal, affect normal/bright        Signed Electronically by: Pilar Marks PA-C

## 2025-02-27 LAB
ALBUMIN SERPL BCG-MCNC: 4.4 G/DL (ref 3.5–5.2)
ALP SERPL-CCNC: 46 U/L (ref 40–150)
ALT SERPL W P-5'-P-CCNC: 17 U/L (ref 0–50)
ANION GAP SERPL CALCULATED.3IONS-SCNC: 10 MMOL/L (ref 7–15)
AST SERPL W P-5'-P-CCNC: 27 U/L (ref 0–45)
BILIRUB SERPL-MCNC: 0.2 MG/DL
BUN SERPL-MCNC: 12.4 MG/DL (ref 6–20)
CALCIUM SERPL-MCNC: 9.6 MG/DL (ref 8.8–10.4)
CHLORIDE SERPL-SCNC: 102 MMOL/L (ref 98–107)
CHOLEST SERPL-MCNC: 140 MG/DL
CREAT SERPL-MCNC: 0.83 MG/DL (ref 0.51–0.95)
EGFRCR SERPLBLD CKD-EPI 2021: >90 ML/MIN/1.73M2
FASTING STATUS PATIENT QL REPORTED: NO
FASTING STATUS PATIENT QL REPORTED: NO
FERRITIN SERPL-MCNC: 72 NG/ML (ref 6–175)
GLUCOSE SERPL-MCNC: 84 MG/DL (ref 70–99)
HCO3 SERPL-SCNC: 26 MMOL/L (ref 22–29)
HDLC SERPL-MCNC: 44 MG/DL
IRON BINDING CAPACITY (ROCHE): 283 UG/DL (ref 240–430)
IRON SATN MFR SERPL: 34 % (ref 15–46)
IRON SERPL-MCNC: 96 UG/DL (ref 37–145)
LDLC SERPL CALC-MCNC: 74 MG/DL
NONHDLC SERPL-MCNC: 96 MG/DL
POTASSIUM SERPL-SCNC: 4.2 MMOL/L (ref 3.4–5.3)
PROT SERPL-MCNC: 7.5 G/DL (ref 6.4–8.3)
SODIUM SERPL-SCNC: 138 MMOL/L (ref 135–145)
TRIGL SERPL-MCNC: 112 MG/DL
VIT B12 SERPL-MCNC: 581 PG/ML (ref 232–1245)
VIT D+METAB SERPL-MCNC: 80 NG/ML (ref 20–50)

## 2025-02-27 NOTE — RESULT ENCOUNTER NOTE
Dear Aurora,    Your blood cell counts, electrolytes, kidney function and blood sugar were normal.  Iron, B12, vitamin D and protein are all fine - so you're doing great with your supplements. You could back off on your vitamin D if you want.  Cholesterol looks good - keep up the good work! HDL/good cholesterol can be increased with increasing physical activity.    Take care,  Pilar Marks PA-C

## 2025-03-24 NOTE — LETTER
8/3/2021       RE: Aurora Durant  [address]  [facility] [address]     Dear Colleague,    Thank you for referring your patient, Aurora Durant, to the [facility] at [facility]. Please see a copy of my visit note below.    CC: pyelonephritis    HPI:  Aurora Durant is a pleasant 35 year old female who presents in consultation from Ariana Garcia PA-C for evaluation of the above. Had vague symptoms of UTI leading up to her eventual visit to the ER for pain and fever. Noted to have Hypoenhancing area in the mid right kidney concerning for pyelonephritis on CT. Renal US has noted this to be resolved. Drinking more water.      Past Medical History:   Diagnosis Date     Anxiety      ASCUS with positive high risk HPV 12-3-10     GERD (gastroesophageal reflux disease)      History of depression      Iritis 2012       Past Surgical History:   Procedure Laterality Date     COLPOSCOPY CERVIX, LOOP ELECTRODE BIOPSY, COMBINED  3-25-11    [facility]     NO HISTORY OF SURGERY         Social History     Socioeconomic History     Marital status: Single     Spouse name: Not on file     Number of children: Not on file     Years of education: Not on file     Highest education level: Not on file   Occupational History     Not on file   Tobacco Use     Smoking status: Never Smoker     Smokeless tobacco: Never Used   Substance and Sexual Activity     Alcohol use: Yes     Comment: 1-2 drinks per week     Drug use: Yes     Types: Marijuana     Sexual activity: Yes     Partners: Male   Other Topics Concern     Parent/sibling w/ CABG, MI or angioplasty before 65F 55M? 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     Social Determinants of Health     Financial Resource Strain:      Difficulty of Paying Living Expenses:    Food Insecurity:      Worried About Running Out of Food in the Last Year:       Ran Out of Food in the Last Year:    Transportation Needs:      Lack of Transportation (Medical):      Lack of Transportation (Non-Medical):    Physical Activity:      Days of Exercise per Week:      Minutes of Exercise per Session:    Stress:      Feeling of Stress :    Social Connections:      Frequency of Communication with Friends and Family:      Frequency of Social Gatherings with Friends and Family:      Attends [employer] Services:      Active Member of Clubs or Organizations:      Attends Club or Organization Meetings:      Marital Status:    Intimate Partner Violence:      Fear of Current or Ex-Partner:      Emotionally Abused:      Physically Abused:      Sexually Abused:        Family History   Problem Relation Age of Onset     Depression Maternal Grandmother      Depression Maternal Uncle      Hypertension Paternal Grandmother      Skin Cancer Other         PGF     Colon Cancer Maternal Uncle 65       ROS:14 point ROS neg other than the symptoms noted above in the HPI.    No Known Allergies    Current Outpatient Medications   Medication     Cyanocobalamin (B-12) 1000 MCG TBCR     UNABLE TO FIND     UNABLE TO FIND     vitamin D3 (CHOLECALCIFEROL) 50 mcg (2000 units) tablet     No current facility-administered medications for this visit.         PEx:     PSYCH: NAD  EYES: EOMI  NEURO: AAO x3    Urine: mod blood  PVR: 147 cc  Renal US norm    A/P: Aurora Durant is a 35 year old female with an episode of pyelonephritis, recovered  -UA. UC if symptoms.   -Discussed Hiprex/Vit C or post-coital abx if this becomes a recurrent problem.  -Could consider pelvic floor PT if frequency and sense of incomplete emptying continue. Work on relaxing pelvic floor with each void.   Shala Stafford PA-C  [facility]    27 minutes spent on the date of the encounter doing chart review, review of outside records, review of test results, interpretation of tests, patient visit and documentation   CT ABDOMEN AND PELVIS WITH  CONTRAST 6/10/2021 10:03 AM     CLINICAL HISTORY: Abdominal pain. Flank pain, kidney stone suspected.  Right flank pain with fever.  Concern for pyelonephritis vs infected  stone.     TECHNIQUE: CT scan of the abdomen and pelvis was performed following  injection of IV contrast. Multiplanar reformats were obtained. Dose  reduction techniques were used.  CONTRAST: 59 mL [id number]     COMPARISON: None.     FINDINGS:   LOWER CHEST: No infiltrates or effusions.     HEPATOBILIARY: No significant mass or bile duct dilatation. No  calcified gallstones.      PANCREAS: No significant mass, duct dilatation, or inflammatory  change.     SPLEEN: Normal size.     ADRENAL GLANDS: No significant nodules.     KIDNEYS/BLADDER: Hypoenhancing area in the mid right kidney with  perinephric and periureteral stranding compatible with right  pyelonephritis. No definite left pyelonephritis. No definite  urolithiasis, tiny stones can be obscured by contrast.     BOWEL: No obstruction or inflammatory change.     PELVIC ORGANS: No pelvic masses. Foreign body in the vagina,  presumably a pelvic floor stabilizer.     ADDITIONAL FINDINGS: Small amount of pelvic free fluid which is  nonspecific but likely physiologic.     MUSCULOSKELETAL: Survey of the visualized bony structures demonstrates  no destructive bony lesions.                                                                      IMPRESSION: Hypoenhancing area in the mid right kidney concerning for  pyelonephritis given the history. Consider ultrasound follow-up in 2  months to confirm resolution and to exclude an underlying lesion.       (M6) obeys commands